# Patient Record
Sex: FEMALE | Race: WHITE | Employment: UNEMPLOYED | URBAN - METROPOLITAN AREA
[De-identification: names, ages, dates, MRNs, and addresses within clinical notes are randomized per-mention and may not be internally consistent; named-entity substitution may affect disease eponyms.]

---

## 2019-05-27 ENCOUNTER — HOSPITAL ENCOUNTER (EMERGENCY)
Age: 29
Discharge: HOME | End: 2019-05-27
Attending: EMERGENCY MEDICINE
Payer: COMMERCIAL

## 2019-05-27 ENCOUNTER — APPOINTMENT (OUTPATIENT)
Dept: CT IMAGING | Age: 29
End: 2019-05-27
Attending: EMERGENCY MEDICINE
Payer: COMMERCIAL

## 2019-05-27 VITALS
OXYGEN SATURATION: 98 % | WEIGHT: 202.6 LBS | TEMPERATURE: 97.5 F | DIASTOLIC BLOOD PRESSURE: 70 MMHG | SYSTOLIC BLOOD PRESSURE: 111 MMHG | HEART RATE: 53 BPM | RESPIRATION RATE: 26 BRPM

## 2019-05-27 DIAGNOSIS — N23 RENAL COLIC ON LEFT SIDE: Primary | ICD-10-CM

## 2019-05-27 LAB
ALBUMIN SERPL-MCNC: 3.9 G/DL (ref 3.5–5)
ALBUMIN/GLOB SERPL: 1.1 {RATIO} (ref 1.1–2.2)
ALP SERPL-CCNC: 77 U/L (ref 45–117)
ALT SERPL-CCNC: 23 U/L (ref 12–78)
AMPHET UR QL SCN: POSITIVE
ANION GAP SERPL CALC-SCNC: 15 MMOL/L (ref 5–15)
APPEARANCE UR: CLEAR
AST SERPL-CCNC: 14 U/L (ref 15–37)
BACTERIA URNS QL MICRO: ABNORMAL /HPF
BARBITURATES UR QL SCN: NEGATIVE
BASOPHILS # BLD: 0 K/UL (ref 0–0.1)
BASOPHILS NFR BLD: 0 % (ref 0–1)
BENZODIAZ UR QL: NEGATIVE
BILIRUB SERPL-MCNC: 0.4 MG/DL (ref 0.2–1)
BILIRUB UR QL CFM: NEGATIVE
BUN SERPL-MCNC: 9 MG/DL (ref 6–20)
BUN/CREAT SERPL: 9 (ref 12–20)
CALCIUM SERPL-MCNC: 8.8 MG/DL (ref 8.5–10.1)
CANNABINOIDS UR QL SCN: POSITIVE
CHLORIDE SERPL-SCNC: 101 MMOL/L (ref 97–108)
CO2 SERPL-SCNC: 26 MMOL/L (ref 21–32)
COCAINE UR QL SCN: NEGATIVE
COLOR UR: ABNORMAL
CREAT SERPL-MCNC: 0.98 MG/DL (ref 0.55–1.02)
DIFFERENTIAL METHOD BLD: ABNORMAL
DRUG SCRN COMMENT,DRGCM: ABNORMAL
EOSINOPHIL # BLD: 0 K/UL (ref 0–0.4)
EOSINOPHIL NFR BLD: 0 % (ref 0–7)
EPITH CASTS URNS QL MICRO: ABNORMAL /LPF
ERYTHROCYTE [DISTWIDTH] IN BLOOD BY AUTOMATED COUNT: 13.6 % (ref 11.5–14.5)
GLOBULIN SER CALC-MCNC: 3.5 G/DL (ref 2–4)
GLUCOSE SERPL-MCNC: 138 MG/DL (ref 65–100)
GLUCOSE UR STRIP.AUTO-MCNC: NEGATIVE MG/DL
HCG UR QL: NEGATIVE
HCT VFR BLD AUTO: 39.9 % (ref 35–47)
HGB BLD-MCNC: 13.1 G/DL (ref 11.5–16)
HGB UR QL STRIP: ABNORMAL
IMM GRANULOCYTES # BLD AUTO: 0 K/UL (ref 0–0.04)
IMM GRANULOCYTES NFR BLD AUTO: 0 % (ref 0–0.5)
KETONES UR QL STRIP.AUTO: ABNORMAL MG/DL
LEUKOCYTE ESTERASE UR QL STRIP.AUTO: NEGATIVE
LIPASE SERPL-CCNC: 89 U/L (ref 73–393)
LYMPHOCYTES # BLD: 2.4 K/UL (ref 0.8–3.5)
LYMPHOCYTES NFR BLD: 21 % (ref 12–49)
MAGNESIUM SERPL-MCNC: 1.8 MG/DL (ref 1.6–2.4)
MCH RBC QN AUTO: 29.4 PG (ref 26–34)
MCHC RBC AUTO-ENTMCNC: 32.8 G/DL (ref 30–36.5)
MCV RBC AUTO: 89.7 FL (ref 80–99)
METHADONE UR QL: NEGATIVE
MONOCYTES # BLD: 0.5 K/UL (ref 0–1)
MONOCYTES NFR BLD: 5 % (ref 5–13)
MUCOUS THREADS URNS QL MICRO: ABNORMAL /LPF
NEUTS SEG # BLD: 8.3 K/UL (ref 1.8–8)
NEUTS SEG NFR BLD: 74 % (ref 32–75)
NITRITE UR QL STRIP.AUTO: NEGATIVE
NRBC # BLD: 0 K/UL (ref 0–0.01)
NRBC BLD-RTO: 0 PER 100 WBC
OPIATES UR QL: NEGATIVE
PCP UR QL: NEGATIVE
PH UR STRIP: 6 [PH] (ref 5–8)
PLATELET # BLD AUTO: 294 K/UL (ref 150–400)
PMV BLD AUTO: 10.9 FL (ref 8.9–12.9)
POTASSIUM SERPL-SCNC: 3.2 MMOL/L (ref 3.5–5.1)
PROT SERPL-MCNC: 7.4 G/DL (ref 6.4–8.2)
PROT UR STRIP-MCNC: 100 MG/DL
RBC # BLD AUTO: 4.45 M/UL (ref 3.8–5.2)
RBC #/AREA URNS HPF: >100 /HPF (ref 0–5)
SODIUM SERPL-SCNC: 142 MMOL/L (ref 136–145)
SP GR UR REFRACTOMETRY: 1.03 (ref 1–1.03)
UR CULT HOLD, URHOLD: NORMAL
UROBILINOGEN UR QL STRIP.AUTO: 1 EU/DL (ref 0.2–1)
WBC # BLD AUTO: 11.3 K/UL (ref 3.6–11)
WBC URNS QL MICRO: ABNORMAL /HPF (ref 0–4)

## 2019-05-27 PROCEDURE — 81001 URINALYSIS AUTO W/SCOPE: CPT

## 2019-05-27 PROCEDURE — 80053 COMPREHEN METABOLIC PANEL: CPT

## 2019-05-27 PROCEDURE — 74176 CT ABD & PELVIS W/O CONTRAST: CPT

## 2019-05-27 PROCEDURE — 99285 EMERGENCY DEPT VISIT HI MDM: CPT

## 2019-05-27 PROCEDURE — 36415 COLL VENOUS BLD VENIPUNCTURE: CPT

## 2019-05-27 PROCEDURE — 93005 ELECTROCARDIOGRAM TRACING: CPT

## 2019-05-27 PROCEDURE — 96375 TX/PRO/DX INJ NEW DRUG ADDON: CPT

## 2019-05-27 PROCEDURE — 80307 DRUG TEST PRSMV CHEM ANLYZR: CPT

## 2019-05-27 PROCEDURE — 96374 THER/PROPH/DIAG INJ IV PUSH: CPT

## 2019-05-27 PROCEDURE — 83735 ASSAY OF MAGNESIUM: CPT

## 2019-05-27 PROCEDURE — 96361 HYDRATE IV INFUSION ADD-ON: CPT

## 2019-05-27 PROCEDURE — 83690 ASSAY OF LIPASE: CPT

## 2019-05-27 PROCEDURE — 81025 URINE PREGNANCY TEST: CPT

## 2019-05-27 PROCEDURE — 74011250636 HC RX REV CODE- 250/636: Performed by: EMERGENCY MEDICINE

## 2019-05-27 PROCEDURE — 85025 COMPLETE CBC W/AUTO DIFF WBC: CPT

## 2019-05-27 RX ORDER — OXYCODONE AND ACETAMINOPHEN 5; 325 MG/1; MG/1
1-2 TABLET ORAL
Qty: 20 TAB | Refills: 0 | Status: SHIPPED | OUTPATIENT
Start: 2019-05-27 | End: 2019-05-29 | Stop reason: SINTOL

## 2019-05-27 RX ORDER — ONDANSETRON 8 MG/1
8 TABLET, ORALLY DISINTEGRATING ORAL
Qty: 12 TAB | Refills: 0 | Status: SHIPPED | OUTPATIENT
Start: 2019-05-27 | End: 2019-05-29

## 2019-05-27 RX ORDER — KETOROLAC TROMETHAMINE 30 MG/ML
15 INJECTION, SOLUTION INTRAMUSCULAR; INTRAVENOUS
Status: COMPLETED | OUTPATIENT
Start: 2019-05-27 | End: 2019-05-27

## 2019-05-27 RX ORDER — TAMSULOSIN HYDROCHLORIDE 0.4 MG/1
0.4 CAPSULE ORAL DAILY
Qty: 15 CAP | Refills: 0 | Status: SHIPPED | OUTPATIENT
Start: 2019-05-27 | End: 2019-06-11

## 2019-05-27 RX ORDER — ONDANSETRON 2 MG/ML
4 INJECTION INTRAMUSCULAR; INTRAVENOUS
Status: COMPLETED | OUTPATIENT
Start: 2019-05-27 | End: 2019-05-27

## 2019-05-27 RX ADMIN — KETOROLAC TROMETHAMINE 15 MG: 30 INJECTION, SOLUTION INTRAMUSCULAR at 18:12

## 2019-05-27 RX ADMIN — ONDANSETRON 4 MG: 2 INJECTION INTRAMUSCULAR; INTRAVENOUS at 18:12

## 2019-05-27 RX ADMIN — SODIUM CHLORIDE 1000 ML: 900 INJECTION, SOLUTION INTRAVENOUS at 18:12

## 2019-05-27 NOTE — ED TRIAGE NOTES
Triage Note: Pt arrives via EMS from home for abdominal pain and back pain. Per patient she was outside all day and had a syncopal episode after coming inside the house. Reports poor PO intake today. Pain is in her left pelvic area and lower back.

## 2019-05-27 NOTE — DISCHARGE INSTRUCTIONS
- Flomax as prescribed. - Percocet as needed for pain. Zofran as needed for nausea and/ or vomiting.  - Please follow-up with [facility]. You may call their Kidney Stone Hotline at [phone] to arrange follow-up. - Return to ED for fever, uncontrolled pain, persistent vomiting, any other concerns. Kidney Stone: Care Instructions  Your Care Instructions    Kidney stones are formed when salts, minerals, and other substances normally found in the urine clump together. They can be as small as grains of sand or, rarely, as large as golf balls. While the stone is traveling through the ureter, which is the tube that carries urine from the kidney to the bladder, you will probably feel pain. The pain may be mild or very severe. You may also have some blood in your urine. As soon as the stone reaches the bladder, any intense pain should go away. If a stone is too large to pass on its own, you may need a medical procedure to help you pass the stone. The doctor has checked you carefully, but problems can develop later. If you notice any problems or new symptoms, get medical treatment right away. Follow-up care is a key part of your treatment and safety. Be sure to make and go to all appointments, and call your doctor if you are having problems. It's also a good idea to know your test results and keep a list of the medicines you take. How can you care for yourself at home? · Drink plenty of fluids, enough so that your urine is light yellow or clear like water. If you have kidney, heart, or liver disease and have to limit fluids, talk with your doctor before you increase the amount of fluids you drink. · Take pain medicines exactly as directed. Call your doctor if you think you are having a problem with your medicine. ? If the doctor gave you a prescription medicine for pain, take it as prescribed.   ? If you are not taking a prescription pain medicine, ask your doctor if you can take an over-the-counter medicine. Read and follow all instructions on the label. · Your doctor may ask you to strain your urine so that you can collect your kidney stone when it passes. You can use a kitchen strainer or a tea strainer to catch the stone. Store it in a plastic bag until you see your doctor again. Preventing future kidney stones  Some changes in your diet may help prevent kidney stones. Depending on the cause of your stones, your doctor may recommend that you:  · Drink plenty of fluids, enough so that your urine is light yellow or clear like water. If you have kidney, heart, or liver disease and have to limit fluids, talk with your doctor before you increase the amount of fluids you drink. · Limit coffee, tea, and alcohol. Also avoid grapefruit juice. · Do not take more than the recommended daily dose of vitamins C and D.  · Avoid antacids such as Gaviscon, Maalox, Mylanta, or Tums. · Limit the amount of salt (sodium) in your diet. · Eat a balanced diet that is not too high in protein. · Limit foods that are high in a substance called oxalate, which can cause kidney stones. These foods include dark green vegetables, rhubarb, chocolate, wheat bran, nuts, cranberries, and beans. When should you call for help? Call your doctor now or seek immediate medical care if:    · You cannot keep down fluids.     · Your pain gets worse.     · You have a fever or chills.     · You have new or worse pain in your back just below your rib cage (the flank area).     · You have new or more blood in your urine.    Watch closely for changes in your health, and be sure to contact your doctor if:    · You do not get better as expected. Where can you learn more? Go to http://marcio-vijay.info/. Enter S255 in the search box to learn more about \"Kidney Stone: Care Instructions. \"  Current as of: March 14, 2018  Content Version: 11.9  © 9513-9206 [employer].  Care instructions adapted under license by [employer] (which disclaims liability or warranty for this information). If you have questions about a medical condition or this instruction, always ask your healthcare professional. [address] any warranty or liability for your use of this information. Learning About Diet for Kidney Stone Prevention  What are kidney stones? Kidney stones are made of salts and minerals in the urine that form small \"esthela. \" Stones can form in the kidneys and the ureters (the tubes that lead from the kidneys to the bladder). They can also form in the bladder. Stones may not cause a problem as long as they stay in the kidneys. But they can cause sudden, severe pain. Pain is most likely when the stones travel from the kidneys to the bladder. Kidney stones can cause bloody urine. Kidney stones often run in families. You are more likely to get them if you don't drink enough fluids, mainly water. Certain foods and drinks and some dietary supplements may also increase your risk for kidney stones if you consume too much of them. What can you do to prevent kidney stones? Changing what you eat may not prevent all types of kidney stones. But for people who have a history of certain kinds of kidney stones, some changes in diet may help. A dietitian can help you set up a meal plan that includes healthy, low-oxalate choices. Here are some general guidelines to get you started. Plan your meals and snacks around foods that are low in oxalate. These foods include:  · Corn, kale, parsnips, and squash,. · Beef, chicken, pork, turkey, and fish. · Milk, butter, cheese, and yogurt. You can eat certain foods that are medium-high in oxalate, but eat them only once in a while. These foods include:  · Bread. · Brown rice. · English muffins. · Figs. · Popcorn. · String beans. · Tomatoes. Limit very high-oxalate foods, including:  · Black tea. · Coffee. · Chocolate. · Dark green vegetables. · Nuts.   Here are some other things you can do to help prevent kidney stones. · Drink plenty of fluids. If you have kidney, heart, or liver disease and have to limit fluids, talk with your doctor before you increase the amount of fluids you drink. · Do not take more than the recommended daily dose of vitamins C and D.  · Limit the salt in your diet. · Eat a balanced diet that is not too high in protein. Follow-up care is a key part of your treatment and safety. Be sure to make and go to all appointments, and call your doctor if you are having problems. It's also a good idea to know your test results and keep a list of the medicines you take. Where can you learn more? Go to http://marcio-vijay.info/. Enter C138 in the search box to learn more about \"Learning About Diet for Kidney Stone Prevention. \"  Current as of: March 28, 2018  Content Version: 11.9  © 9234-3677 [employer]. Care instructions adapted under license by [employer] (which disclaims liability or warranty for this information). If you have questions about a medical condition or this instruction, always ask your healthcare professional. [place] [address] any warranty or liability for your use of this information.

## 2019-05-27 NOTE — ED NOTES
7:00 PM  Change of shift. Care of patient taken over from Dr. Jeremy Urbina; H&P reviewed, bedside handoff complete. Awaiting Ct results. 7:54 PM  2 mm L distal stone. Pain controlled. F/U with [facility].

## 2019-05-27 NOTE — ED PROVIDER NOTES
The history is provided by the patient and a relative. No  was used. Abdominal Pain    This is a new problem. The current episode started 1 to 2 hours ago. The problem occurs constantly. The problem has not changed since onset. The pain is associated with vomiting. The pain is located in the LLQ. The quality of the pain is dull and aching. The pain is at a severity of 9/10. The pain is severe. Associated symptoms include diarrhea, nausea, vomiting and back pain. Pertinent negatives include no anorexia, no fever, no belching, no flatus, no hematochezia, no melena, no constipation, no dysuria, no frequency, no hematuria, no headaches, no arthralgias, no myalgias, no trauma and no chest pain. Nothing worsens the pain. The pain is relieved by nothing. Past Medical History:   Diagnosis Date    Anxiety     Asthma     Depression        Past Surgical History:   Procedure Laterality Date    HX HERNIA REPAIR           History reviewed. No pertinent family history.     Social History     Socioeconomic History    Marital status: SINGLE     Spouse name: Not on file    Number of children: Not on file    Years of education: Not on file    Highest education level: Not on file   Occupational History    Not on file   Social Needs    Financial resource strain: Not on file    Food insecurity:     Worry: Not on file     Inability: Not on file    Transportation needs:     Medical: Not on file     Non-medical: Not on file   Tobacco Use    Smoking status: Current Every Day Smoker    Smokeless tobacco: Never Used   Substance and Sexual Activity    Alcohol use: Not Currently    Drug use: Not on file    Sexual activity: Not on file   Lifestyle    Physical activity:     Days per week: Not on file     Minutes per session: Not on file    Stress: Not on file   Relationships    Social connections:     Talks on phone: Not on file     Gets together: Not on file     Attends Adventism service: Not on file Active member of club or organization: Not on file     Attends meetings of clubs or organizations: Not on file     Relationship status: Not on file    Intimate partner violence:     Fear of current or ex partner: Not on file     Emotionally abused: Not on file     Physically abused: Not on file     Forced sexual activity: Not on file   Other Topics Concern    Not on file   Social History Narrative    Not on file         ALLERGIES: Patient has no known allergies. Review of Systems   Constitutional: Negative for activity change, chills and fever. HENT: Negative for nosebleeds, sore throat, trouble swallowing and voice change. Eyes: Negative for visual disturbance. Respiratory: Negative for shortness of breath. Cardiovascular: Negative for chest pain and palpitations. Gastrointestinal: Positive for abdominal pain, diarrhea, nausea and vomiting. Negative for anorexia, constipation, flatus, hematochezia and melena. Genitourinary: Positive for flank pain and pelvic pain. Negative for difficulty urinating, dysuria, frequency, hematuria and urgency. Musculoskeletal: Positive for back pain. Negative for arthralgias, myalgias, neck pain and neck stiffness. Skin: Negative for color change. Allergic/Immunologic: Negative for immunocompromised state. Neurological: Negative for dizziness, seizures, syncope, weakness, light-headedness, numbness and headaches. Psychiatric/Behavioral: Negative for behavioral problems, confusion, hallucinations, self-injury and suicidal ideas. There were no vitals filed for this visit. Physical Exam   Constitutional: She is oriented to person, place, and time. She appears well-developed and well-nourished. No distress. HENT:   Head: Normocephalic and atraumatic. Eyes: Pupils are equal, round, and reactive to light. Neck: Normal range of motion. Neck supple. Cardiovascular: Regular rhythm and normal heart sounds. Bradycardia present.  Exam reveals no gallop and no friction rub. No murmur heard. Pulmonary/Chest: Effort normal and breath sounds normal. No respiratory distress. She has no wheezes. Abdominal: Soft. Bowel sounds are normal. She exhibits no distension. There is generalized tenderness and tenderness in the left upper quadrant and left lower quadrant. There is no rebound and no guarding. Musculoskeletal: Normal range of motion. Neurological: She is alert and oriented to person, place, and time. Skin: Skin is warm. No rash noted. She is not diaphoretic. Psychiatric: She has a normal mood and affect. Her behavior is normal. Judgment and thought content normal.   Nursing note and vitals reviewed. MDM     This is a 27-year-old female with past medical history, review of systems, physical exam as above, presenting with complaints of sudden onset left groin, left flank, left back pain, approximately 1-2 hours prior to arrival. She endorses nausea and vomiting, secondary to pain, states she's been having intermittent loose bowel movements, without abdominal pain. She endorses a history of previous , denies dysuria, hematuria, states it's recycles have not restarted since she has a 11month-old child. Physical exam is remarkable for uncomfortable young well-appearing female, in no acute distress, with mild left adnexal, left flank, left CVA tenderness to palpation, without rebound or guarding, generalized abdominal tenderness otherwise, clear breath sounds, bradycardic rate without murmurs gallops or rubs. Mother at bedside states there is a family history of renal colic. Plan to provide antiemetics, pain control, fluid bolus, obtain CMP, CBC, UA, DPT, UDS, lipase, noncontrast CT. We will reevaluate, consider further imaging, and make a disposition. Procedures    BESIDE SIGN OUT:  7:06 PM  Discussed pt's hx, disposition, and available diagnostic and imaging results with Dr. Srinath Osorio at bedside with the patient.  Reviewed care plans. Both providers and patient are in agreement with care plan. Dr. Michael Wray is transferring care of the pt to Dr. Alannah Varghese at this time.

## 2019-05-28 LAB
ATRIAL RATE: 48 BPM
CALCULATED P AXIS, ECG09: 56 DEGREES
CALCULATED R AXIS, ECG10: 65 DEGREES
CALCULATED T AXIS, ECG11: 52 DEGREES
DIAGNOSIS, 93000: NORMAL
P-R INTERVAL, ECG05: 98 MS
Q-T INTERVAL, ECG07: 496 MS
QRS DURATION, ECG06: 98 MS
QTC CALCULATION (BEZET), ECG08: 443 MS
VENTRICULAR RATE, ECG03: 48 BPM

## 2019-05-28 NOTE — ED NOTES
Pt. Called wanting something other than percocet because she is at work. Spoke to Dr. Toby Santos for an alternative and he suggested ibuprofen for patient. Pt. Stated when she is on the floor in pain ibuprofen doesn't work. I explained to her that ibuprofen and ketorolac are both anti-inflammatory. Suggested she call stone hot line or she could be seen here if she felt like she should. Pt. Said: \"Nevermind, thank you and have a nice day\". And hung up.

## 2019-05-28 NOTE — ED NOTES
Discharge instructions reviewed with pt and copy given along with RX by this RN. Pt educated on follow up with [facility] as well as pain and nausea management at home and changes to make in diet to decrease chances of producing another kidney stone. Pt verbalized understanding of all education. Pt ambulatory from ED accompanied by mother and family member in no sign of distress or discomfort.

## 2019-05-29 ENCOUNTER — HOSPITAL ENCOUNTER (EMERGENCY)
Age: 29
Discharge: HOME | End: 2019-05-29
Attending: EMERGENCY MEDICINE
Payer: MEDICAID

## 2019-05-29 VITALS
TEMPERATURE: 98 F | BODY MASS INDEX: 30.63 KG/M2 | DIASTOLIC BLOOD PRESSURE: 87 MMHG | WEIGHT: 206.79 LBS | HEART RATE: 80 BPM | SYSTOLIC BLOOD PRESSURE: 120 MMHG | OXYGEN SATURATION: 98 % | HEIGHT: 69 IN | RESPIRATION RATE: 20 BRPM

## 2019-05-29 DIAGNOSIS — N20.0 KIDNEY STONE: Primary | ICD-10-CM

## 2019-05-29 LAB
AMORPH CRY URNS QL MICRO: ABNORMAL
ANION GAP SERPL CALC-SCNC: 9 MMOL/L (ref 5–15)
APPEARANCE UR: ABNORMAL
BACTERIA URNS QL MICRO: NEGATIVE /HPF
BASOPHILS # BLD: 0 K/UL (ref 0–0.1)
BASOPHILS NFR BLD: 0 % (ref 0–1)
BILIRUB UR QL CFM: NEGATIVE
BUN SERPL-MCNC: 10 MG/DL (ref 6–20)
BUN/CREAT SERPL: 7 (ref 12–20)
CALCIUM SERPL-MCNC: 8.8 MG/DL (ref 8.5–10.1)
CHLORIDE SERPL-SCNC: 103 MMOL/L (ref 97–108)
CO2 SERPL-SCNC: 27 MMOL/L (ref 21–32)
COLOR UR: YELLOW
CREAT SERPL-MCNC: 1.39 MG/DL (ref 0.55–1.02)
DIFFERENTIAL METHOD BLD: ABNORMAL
EOSINOPHIL # BLD: 0 K/UL (ref 0–0.4)
EOSINOPHIL NFR BLD: 0 % (ref 0–7)
EPITH CASTS URNS QL MICRO: ABNORMAL /LPF
ERYTHROCYTE [DISTWIDTH] IN BLOOD BY AUTOMATED COUNT: 13.7 % (ref 11.5–14.5)
GLUCOSE SERPL-MCNC: 97 MG/DL (ref 65–100)
GLUCOSE UR STRIP.AUTO-MCNC: NEGATIVE MG/DL
HCT VFR BLD AUTO: 37.5 % (ref 35–47)
HGB BLD-MCNC: 12.5 G/DL (ref 11.5–16)
HGB UR QL STRIP: ABNORMAL
IMM GRANULOCYTES # BLD AUTO: 0 K/UL (ref 0–0.04)
IMM GRANULOCYTES NFR BLD AUTO: 0 % (ref 0–0.5)
KETONES UR QL STRIP.AUTO: ABNORMAL MG/DL
LEUKOCYTE ESTERASE UR QL STRIP.AUTO: NEGATIVE
LYMPHOCYTES # BLD: 1.5 K/UL (ref 0.8–3.5)
LYMPHOCYTES NFR BLD: 16 % (ref 12–49)
MCH RBC QN AUTO: 29.6 PG (ref 26–34)
MCHC RBC AUTO-ENTMCNC: 33.3 G/DL (ref 30–36.5)
MCV RBC AUTO: 88.7 FL (ref 80–99)
MONOCYTES # BLD: 0.8 K/UL (ref 0–1)
MONOCYTES NFR BLD: 8 % (ref 5–13)
MUCOUS THREADS URNS QL MICRO: ABNORMAL /LPF
NEUTS SEG # BLD: 7.3 K/UL (ref 1.8–8)
NEUTS SEG NFR BLD: 76 % (ref 32–75)
NITRITE UR QL STRIP.AUTO: NEGATIVE
NRBC # BLD: 0 K/UL (ref 0–0.01)
NRBC BLD-RTO: 0 PER 100 WBC
PH UR STRIP: 6 [PH] (ref 5–8)
PLATELET # BLD AUTO: 215 K/UL (ref 150–400)
PMV BLD AUTO: 11.2 FL (ref 8.9–12.9)
POTASSIUM SERPL-SCNC: 3.6 MMOL/L (ref 3.5–5.1)
PROT UR STRIP-MCNC: ABNORMAL MG/DL
RBC # BLD AUTO: 4.23 M/UL (ref 3.8–5.2)
RBC #/AREA URNS HPF: ABNORMAL /HPF (ref 0–5)
SODIUM SERPL-SCNC: 139 MMOL/L (ref 136–145)
SP GR UR REFRACTOMETRY: 1.02 (ref 1–1.03)
UR CULT HOLD, URHOLD: NORMAL
UROBILINOGEN UR QL STRIP.AUTO: 0.2 EU/DL (ref 0.2–1)
WBC # BLD AUTO: 9.7 K/UL (ref 3.6–11)
WBC URNS QL MICRO: ABNORMAL /HPF (ref 0–4)

## 2019-05-29 PROCEDURE — 81001 URINALYSIS AUTO W/SCOPE: CPT

## 2019-05-29 PROCEDURE — 96361 HYDRATE IV INFUSION ADD-ON: CPT

## 2019-05-29 PROCEDURE — 96374 THER/PROPH/DIAG INJ IV PUSH: CPT

## 2019-05-29 PROCEDURE — 74011250636 HC RX REV CODE- 250/636: Performed by: EMERGENCY MEDICINE

## 2019-05-29 PROCEDURE — 85025 COMPLETE CBC W/AUTO DIFF WBC: CPT

## 2019-05-29 PROCEDURE — 80048 BASIC METABOLIC PNL TOTAL CA: CPT

## 2019-05-29 PROCEDURE — 96375 TX/PRO/DX INJ NEW DRUG ADDON: CPT

## 2019-05-29 PROCEDURE — 36415 COLL VENOUS BLD VENIPUNCTURE: CPT

## 2019-05-29 PROCEDURE — 99284 EMERGENCY DEPT VISIT MOD MDM: CPT

## 2019-05-29 RX ORDER — ACETAMINOPHEN 500 MG
1000 TABLET ORAL 3 TIMES DAILY
Qty: 24 TAB | Refills: 0 | Status: SHIPPED | OUTPATIENT
Start: 2019-05-29 | End: 2019-06-02

## 2019-05-29 RX ORDER — KETOROLAC TROMETHAMINE 30 MG/ML
30 INJECTION, SOLUTION INTRAMUSCULAR; INTRAVENOUS
Status: COMPLETED | OUTPATIENT
Start: 2019-05-29 | End: 2019-05-29

## 2019-05-29 RX ORDER — ONDANSETRON 8 MG/1
8 TABLET, ORALLY DISINTEGRATING ORAL
Qty: 12 TAB | Refills: 0 | Status: SHIPPED | OUTPATIENT
Start: 2019-05-29 | End: 2019-06-05

## 2019-05-29 RX ORDER — FENTANYL CITRATE 50 UG/ML
100 INJECTION, SOLUTION INTRAMUSCULAR; INTRAVENOUS
Status: COMPLETED | OUTPATIENT
Start: 2019-05-29 | End: 2019-05-29

## 2019-05-29 RX ORDER — KETOROLAC TROMETHAMINE 10 MG/1
10 TABLET, FILM COATED ORAL
Qty: 20 TAB | Refills: 0 | Status: SHIPPED | OUTPATIENT
Start: 2019-05-29 | End: 2019-06-03

## 2019-05-29 RX ORDER — ONDANSETRON 2 MG/ML
4 INJECTION INTRAMUSCULAR; INTRAVENOUS
Status: COMPLETED | OUTPATIENT
Start: 2019-05-29 | End: 2019-05-29

## 2019-05-29 RX ADMIN — ONDANSETRON 4 MG: 2 INJECTION INTRAMUSCULAR; INTRAVENOUS at 21:48

## 2019-05-29 RX ADMIN — KETOROLAC TROMETHAMINE 30 MG: 30 INJECTION, SOLUTION INTRAMUSCULAR at 21:48

## 2019-05-29 RX ADMIN — FENTANYL CITRATE 100 MCG: 50 INJECTION, SOLUTION INTRAMUSCULAR; INTRAVENOUS at 21:48

## 2019-05-29 RX ADMIN — SODIUM CHLORIDE 1000 ML: 900 INJECTION, SOLUTION INTRAVENOUS at 21:55

## 2019-05-29 NOTE — LETTER
I have been throwing up tonight.   Started the day with dizziness first. [address] 
[facility] 
[address] [facility] [id number] 
[phone] Work/School Note Date: 5/29/2019 To Whom It May concern: 
 
Imer Shah was seen and treated today in the emergency room by the following provider(s): 
Attending Provider: Laure Gonsalves MD. Imer Shah may return to work on 5/31/2019.  
 
Sincerely, 
 
 
 
 
Tere Kaufman MD

## 2019-05-30 NOTE — ED TRIAGE NOTES
Pt ambulatory to ed with complaints of left flank pain and N/V. Pt was dx with kidney stones Monday.

## 2019-05-30 NOTE — ED PROVIDER NOTES
61-year-old female presenting to ER with complaints of left flank pain and nausea and vomiting. Patient was seen 2 days ago and diagnosed with kidney stone, 2 mm in the distal ureter  Given prescription for Zofran and Percocet. Patient has not been taking any anti-inflammatory. Patient reports having continued pain and episodes of nausea vomiting. Patient denies any fevers or chills. No dysuria. Stating that the pain is waxing and waning causing her to nauseous and not able to tolerate her medications. Patient reports not taking any anti-inflammatories stating it does not work. Negative pregnancy test 2 nights ago  Past med history of anxiety, asthma, depression      Kidney Stone    Associated symptoms include abdominal pain. Pertinent negatives include no chest pain, no fever, no headaches and no dysuria. Past Medical History:   Diagnosis Date    Anxiety     Asthma     Depression        Past Surgical History:   Procedure Laterality Date    HX HERNIA REPAIR           History reviewed. No pertinent family history.     Social History     Socioeconomic History    Marital status: SINGLE     Spouse name: Not on file    Number of children: Not on file    Years of education: Not on file    Highest education level: Not on file   Occupational History    Not on file   Social Needs    Financial resource strain: Not on file    Food insecurity:     Worry: Not on file     Inability: Not on file    Transportation needs:     Medical: Not on file     Non-medical: Not on file   Tobacco Use    Smoking status: Current Every Day Smoker     Packs/day: 1.00    Smokeless tobacco: Never Used   Substance and Sexual Activity    Alcohol use: Not Currently    Drug use: Never    Sexual activity: Not on file   Lifestyle    Physical activity:     Days per week: Not on file     Minutes per session: Not on file    Stress: Not on file   Relationships    Social connections:     Talks on phone: Not on file     Gets together: Not on file     Attends Buddhism service: Not on file     Active member of club or organization: Not on file     Attends meetings of clubs or organizations: Not on file     Relationship status: Not on file    Intimate partner violence:     Fear of current or ex partner: Not on file     Emotionally abused: Not on file     Physically abused: Not on file     Forced sexual activity: Not on file   Other Topics Concern    Not on file   Social History Narrative    Not on file         ALLERGIES: Patient has no known allergies. Review of Systems   Constitutional: Negative for chills and fever. HENT: Negative for congestion and sore throat. Eyes: Negative for pain. Respiratory: Negative for shortness of breath. Cardiovascular: Negative for chest pain. Gastrointestinal: Positive for abdominal pain, nausea and vomiting. Negative for diarrhea. Genitourinary: Positive for flank pain. Negative for dysuria. Musculoskeletal: Negative for back pain and neck pain. Skin: Negative for rash. Neurological: Negative for dizziness and headaches. Vitals:    05/29/19 2200 05/29/19 2215 05/29/19 2221 05/29/19 2231   BP: 122/78 120/87     Pulse:    80   Resp:    20   Temp:    98 °F (36.7 °C)   SpO2: 100% 97% 98% 98%   Weight:       Height:                Physical Exam   Constitutional: She is oriented to person, place, and time. She appears well-developed and well-nourished. Cry, anxious and mild distress   HENT:   Head: Normocephalic. Mouth/Throat: Oropharynx is clear and moist.   Eyes: Conjunctivae are normal.   Neck: Normal range of motion. Neck supple. Cardiovascular: Normal rate and regular rhythm. Pulmonary/Chest: Effort normal and breath sounds normal. No respiratory distress. Abdominal: Soft. Bowel sounds are normal. There is no tenderness. There is no rigidity, no rebound, no guarding, no CVA tenderness, no tenderness at McBurney's point and negative Kilpatrick's sign.    Flank pain, non reproducible   Musculoskeletal: Normal range of motion. Neurological: She is alert and oriented to person, place, and time. No gross motor or sensory deficits   Skin: Skin is warm. Capillary refill takes less than 2 seconds. No rash noted. Recent Results (from the past 24 hour(s))   METABOLIC PANEL, BASIC    Collection Time: 05/29/19  9:41 PM   Result Value Ref Range    Sodium 139 136 - 145 mmol/L    Potassium 3.6 3.5 - 5.1 mmol/L    Chloride 103 97 - 108 mmol/L    CO2 27 21 - 32 mmol/L    Anion gap 9 5 - 15 mmol/L    Glucose 97 65 - 100 mg/dL    BUN 10 6 - 20 MG/DL    Creatinine 1.39 (H) 0.55 - 1.02 MG/DL    BUN/Creatinine ratio 7 (L) 12 - 20      GFR est AA 55 (L) >60 ml/min/1.73m2    GFR est non-AA 45 (L) >60 ml/min/1.73m2    Calcium 8.8 8.5 - 10.1 MG/DL   CBC WITH AUTOMATED DIFF    Collection Time: 05/29/19  9:41 PM   Result Value Ref Range    WBC 9.7 3.6 - 11.0 K/uL    RBC 4.23 3.80 - 5.20 M/uL    HGB 12.5 11.5 - 16.0 g/dL    HCT 37.5 35.0 - 47.0 %    MCV 88.7 80.0 - 99.0 FL    MCH 29.6 26.0 - 34.0 PG    MCHC 33.3 30.0 - 36.5 g/dL    RDW 13.7 11.5 - 14.5 %    PLATELET 372 197 - 766 K/uL    MPV 11.2 8.9 - 12.9 FL    NRBC 0.0 0  WBC    ABSOLUTE NRBC 0.00 0.00 - 0.01 K/uL    NEUTROPHILS 76 (H) 32 - 75 %    LYMPHOCYTES 16 12 - 49 %    MONOCYTES 8 5 - 13 %    EOSINOPHILS 0 0 - 7 %    BASOPHILS 0 0 - 1 %    IMMATURE GRANULOCYTES 0 0.0 - 0.5 %    ABS. NEUTROPHILS 7.3 1.8 - 8.0 K/UL    ABS. LYMPHOCYTES 1.5 0.8 - 3.5 K/UL    ABS. MONOCYTES 0.8 0.0 - 1.0 K/UL    ABS. EOSINOPHILS 0.0 0.0 - 0.4 K/UL    ABS. BASOPHILS 0.0 0.0 - 0.1 K/UL    ABS. IMM.  GRANS. 0.0 0.00 - 0.04 K/UL    DF AUTOMATED     URINALYSIS W/ RFLX MICROSCOPIC    Collection Time: 05/29/19 10:20 PM   Result Value Ref Range    Color YELLOW      Appearance HAZY (A) CLEAR      Specific gravity 1.025 1.003 - 1.030      pH (UA) 6.0 5.0 - 8.0      Protein TRACE (A) NEG mg/dL    Glucose NEGATIVE  NEG mg/dL    Ketone TRACE (A) NEG mg/dL    Blood LARGE (A) NEG      Urobilinogen 0.2 0.2 - 1.0 EU/dL    Nitrites NEGATIVE  NEG      Leukocyte Esterase NEGATIVE  NEG     URINE CULTURE HOLD SAMPLE    Collection Time: 05/29/19 10:20 PM   Result Value Ref Range    Urine culture hold        URINE ON HOLD IN MICROBIOLOGY DEPT FOR 3 DAYS. IF UNPRESERVED URINE IS SUBMITTED, IT CANNOT BE USED FOR ADDITIONAL TESTING AFTER 24 HRS, RECOLLECTION WILL BE REQUIRED. No results found. MDM  Number of Diagnoses or Management Options  Kidney stone:   Diagnosis management comments: Patient was recent diagnosis of left kidney stone presenting with pain but mainly of nausea and vomiting. Patient reports worsening nausea with taking the Percocet. Patient reports that she stopped taking the Percocet as developed nausea with vomiting and Tylenol for pain. Patient's pain improved significantly after receiving Toradol in the ER.  No fevers no UTI  Patient be discharged with Toradol  Given stone hotline information       Amount and/or Complexity of Data Reviewed  Clinical lab tests: reviewed      ED Course as of May 29 2239   Wed May 29, 2019   2227 Pt's pain is improved    [ZD]      ED Course User Index  [ZD] Luis Alberto Chi MD       Procedures

## 2019-05-30 NOTE — DISCHARGE INSTRUCTIONS
Patient Education        Kidney Stone: Care Instructions  Your Care Instructions    Kidney stones are formed when salts, minerals, and other substances normally found in the urine clump together. They can be as small as grains of sand or, rarely, as large as golf balls. While the stone is traveling through the ureter, which is the tube that carries urine from the kidney to the bladder, you will probably feel pain. The pain may be mild or very severe. You may also have some blood in your urine. As soon as the stone reaches the bladder, any intense pain should go away. If a stone is too large to pass on its own, you may need a medical procedure to help you pass the stone. The doctor has checked you carefully, but problems can develop later. If you notice any problems or new symptoms, get medical treatment right away. Follow-up care is a key part of your treatment and safety. Be sure to make and go to all appointments, and call your doctor if you are having problems. It's also a good idea to know your test results and keep a list of the medicines you take. How can you care for yourself at home? · Drink plenty of fluids, enough so that your urine is light yellow or clear like water. If you have kidney, heart, or liver disease and have to limit fluids, talk with your doctor before you increase the amount of fluids you drink. · Take pain medicines exactly as directed. Call your doctor if you think you are having a problem with your medicine. ? If the doctor gave you a prescription medicine for pain, take it as prescribed. ? If you are not taking a prescription pain medicine, ask your doctor if you can take an over-the-counter medicine. Read and follow all instructions on the label. · Your doctor may ask you to strain your urine so that you can collect your kidney stone when it passes. You can use a kitchen strainer or a tea strainer to catch the stone.  Store it in a plastic bag until you see your doctor again.  Preventing future kidney stones  Some changes in your diet may help prevent kidney stones. Depending on the cause of your stones, your doctor may recommend that you:  · Drink plenty of fluids, enough so that your urine is light yellow or clear like water. If you have kidney, heart, or liver disease and have to limit fluids, talk with your doctor before you increase the amount of fluids you drink. · Limit coffee, tea, and alcohol. Also avoid grapefruit juice. · Do not take more than the recommended daily dose of vitamins C and D.  · Avoid antacids such as Gaviscon, Maalox, Mylanta, or Tums. · Limit the amount of salt (sodium) in your diet. · Eat a balanced diet that is not too high in protein. · Limit foods that are high in a substance called oxalate, which can cause kidney stones. These foods include dark green vegetables, rhubarb, chocolate, wheat bran, nuts, cranberries, and beans. When should you call for help? Call your doctor now or seek immediate medical care if:    · You cannot keep down fluids.     · Your pain gets worse.     · You have a fever or chills.     · You have new or worse pain in your back just below your rib cage (the flank area).     · You have new or more blood in your urine.    Watch closely for changes in your health, and be sure to contact your doctor if:    · You do not get better as expected. Where can you learn more? Go to http://marcio-vijay.info/. Enter N015 in the search box to learn more about \"Kidney Stone: Care Instructions. \"  Current as of: March 14, 2018  Content Version: 11.9  © 8892-1601 [employer]. Care instructions adapted under license by [employer] (which disclaims liability or warranty for this information).  If you have questions about a medical condition or this instruction, always ask your healthcare professional. [address] any warranty or liability for your use of this information.

## 2019-05-30 NOTE — ED NOTES
-- On warfarin for h/o PE  -- will get U/S BL UE and LE to eval for need for IVF   Pt provided with warm blanket at this time. Pt denies ability to urinate at this time.

## 2020-05-03 ENCOUNTER — HOSPITAL ENCOUNTER (EMERGENCY)
Age: 30
Discharge: HOME | End: 2020-05-03
Attending: EMERGENCY MEDICINE | Admitting: EMERGENCY MEDICINE
Payer: COMMERCIAL

## 2020-05-03 VITALS — OXYGEN SATURATION: 98 % | RESPIRATION RATE: 16 BRPM | TEMPERATURE: 98.4 F | HEART RATE: 85 BPM

## 2020-05-03 DIAGNOSIS — B34.9 VIRAL INFECTION: Primary | ICD-10-CM

## 2020-05-03 PROCEDURE — 99282 EMERGENCY DEPT VISIT SF MDM: CPT

## 2020-05-03 PROCEDURE — 87635 SARS-COV-2 COVID-19 AMP PRB: CPT

## 2020-05-03 RX ORDER — BENZONATATE 100 MG/1
100 CAPSULE ORAL
Qty: 30 CAP | Refills: 0 | Status: SHIPPED | OUTPATIENT
Start: 2020-05-03 | End: 2020-05-10

## 2020-05-04 ENCOUNTER — TELEPHONE (OUTPATIENT)
Dept: CASE MANAGEMENT | Age: 30
End: 2020-05-04

## 2020-05-04 NOTE — DISCHARGE INSTRUCTIONS
Patient Education        Viral Infections: Care Instructions  Your Care Instructions    You don't feel well, but it's not clear what's causing it. You may have a viral infection. Viruses cause many illnesses, such as the common cold, influenza, fever, rashes, and the diarrhea, nausea, and vomiting that are often called \"stomach flu. \" You may wonder if antibiotic medicines could make you feel better. But antibiotics only treat infections caused by bacteria. They don't work on viruses. The good news is that viral infections usually aren't serious. Most will go away in a few days without medical treatment. In the meantime, there are a few things you can do to make yourself more comfortable. Follow-up care is a key part of your treatment and safety. Be sure to make and go to all appointments, and call your doctor if you are having problems. It's also a good idea to know your test results and keep a list of the medicines you take. How can you care for yourself at home? · Get plenty of rest if you feel tired. · Take an over-the-counter pain medicine if needed, such as acetaminophen (Tylenol), ibuprofen (Advil, Motrin), or naproxen (Aleve). Read and follow all instructions on the label. · Be careful when taking over-the-counter cold or flu medicines and Tylenol at the same time. Many of these medicines have acetaminophen, which is Tylenol. Read the labels to make sure that you are not taking more than the recommended dose. Too much acetaminophen (Tylenol) can be harmful. · Drink plenty of fluids, enough so that your urine is light yellow or clear like water. If you have kidney, heart, or liver disease and have to limit fluids, talk with your doctor before you increase the amount of fluids you drink. · Stay home from work, school, and other public places while you have a fever. When should you call for help? Call 911 anytime you think you may need emergency care.  For example, call if:    · You have severe trouble breathing.     · You passed out (lost consciousness).    Call your doctor now or seek immediate medical care if:    · You seem to be getting much sicker.     · You have a new or higher fever.     · You have blood in your stools.     · You have new belly pain, or your pain gets worse.     · You have a new rash.    Watch closely for changes in your health, and be sure to contact your doctor if:    · You start to get better and then get worse.     · You do not get better as expected. Where can you learn more? Go to http://marcio-vijay.info/  Enter L906 in the search box to learn more about \"Viral Infections: Care Instructions. \"  Current as of: January 26, 2020Content Version: 12.4  © 2601-8844 [employer]. Care instructions adapted under license by [employer] (which disclaims liability or warranty for this information). If you have questions about a medical condition or this instruction, always ask your healthcare professional. [place] [address] any warranty or liability for your use of this information. Advance Care Planning  People with COVID-19 may have no symptoms, mild symptoms, such as fever, cough, and shortness of breath or they may have more severe illness, developing severe and fatal pneumonia. As a result, Advance Care Planning with attention to naming a health care decision maker (someone you trust to make healthcare decisions for you if you could not speak for yourself) and sharing other health care preferences is important BEFORE a possible health crisis. Please contact your Primary Care Provider to discuss Advance Care Planning.      Preventing the Spread of Coronavirus Disease 2019 in Homes and Residential Communities  For the most recent information go to RetailCleaners.fi    Prevention steps for People with confirmed or suspected COVID-19 (including persons under investigation) who do not need to be hospitalized  and   People with confirmed COVID-19 who were hospitalized and determined to be medically stable to go home    Your healthcare provider and public health staff will evaluate whether you can be cared for at home. If it is determined that you do not need to be hospitalized and can be isolated at home, you will be monitored by staff from your local or state health department. You should follow the prevention steps below until a healthcare provider or local or state health department says you can return to your normal activities. Stay home except to get medical care  People who are mildly ill with COVID-19 are able to isolate at home during their illness. You should restrict activities outside your home, except for getting medical care. Do not go to work, school, or public areas. Avoid using public transportation, ride-sharing, or taxis. Separate yourself from other people and animals in your home  People: As much as possible, you should stay in a specific room and away from other people in your home. Also, you should use a separate bathroom, if available. Animals: You should restrict contact with pets and other animals while you are sick with COVID-19, just like you would around other people. Although there have not been reports of pets or other animals becoming sick with COVID-19, it is still recommended that people sick with COVID-19 limit contact with animals until more information is known about the virus. When possible, have another member of your household care for your animals while you are sick. If you are sick with COVID-19, avoid contact with your pet, including petting, snuggling, being kissed or licked, and sharing food. If you must care for your pet or be around animals while you are sick, wash your hands before and after you interact with pets and wear a facemask.   Call ahead before visiting your doctor  If you have a medical appointment, call the healthcare provider and tell them that you have or may have COVID-19. This will help the healthcare providers office take steps to keep other people from getting infected or exposed. Wear a facemask  You should wear a facemask when you are around other people (e.g., sharing a room or vehicle) or pets and before you enter a healthcare providers office. If you are not able to wear a facemask (for example, because it causes trouble breathing), then people who live with you should not stay in the same room with you, or they should wear a facemask if they enter your room. Cover your coughs and sneezes  Cover your mouth and nose with a tissue when you cough or sneeze. Throw used tissues in a lined trash can. Immediately wash your hands with soap and water for at least 20 seconds or, if soap and water are not available, clean your hands with an alcohol-based hand  that contains at least 60% alcohol. Clean your hands often  Wash your hands often with soap and water for at least 20 seconds, especially after blowing your nose, coughing, or sneezing; going to the bathroom; and before eating or preparing food. If soap and water are not readily available, use an alcohol-based hand  with at least 60% alcohol, covering all surfaces of your hands and rubbing them together until they feel dry. Soap and water are the best option if hands are visibly dirty. Avoid touching your eyes, nose, and mouth with unwashed hands. Avoid sharing personal household items  You should not share dishes, drinking glasses, cups, eating utensils, towels, or bedding with other people or pets in your home. After using these items, they should be washed thoroughly with soap and water. Clean all high-touch surfaces everyday  High touch surfaces include counters, tabletops, doorknobs, bathroom fixtures, toilets, phones, keyboards, tablets, and bedside tables.  Also, clean any surfaces that may have blood, stool, or body fluids on them. Use a household cleaning spray or wipe, according to the label instructions. Labels contain instructions for safe and effective use of the cleaning product including precautions you should take when applying the product, such as wearing gloves and making sure you have good ventilation during use of the product. Monitor your symptoms  Seek prompt medical attention if your illness is worsening (e.g., difficulty breathing). Before seeking care, call your healthcare provider and tell them that you have, or are being evaluated for, COVID-19. Put on a facemask before you enter the facility. These steps will help the healthcare providers office to keep other people in the office or waiting room from getting infected or exposed. Ask your healthcare provider to call the local or state health department. Persons who are placed under active monitoring or facilitated self-monitoring should follow instructions provided by their local health department or occupational health professionals, as appropriate. When working with your local health department check their available hours. If you have a medical emergency and need to call 911, notify the dispatch personnel that you have, or are being evaluated for COVID-19. If possible, put on a facemask before emergency medical services arrive. Discontinuing home isolation  Patients with confirmed COVID-19 should remain under home isolation precautions until the risk of secondary transmission to others is thought to be low. The decision to discontinue home isolation precautions should be made on a case-by-case basis, in consultation with healthcare providers and state and local health departments.

## 2020-05-04 NOTE — ED PROVIDER NOTES
Ángel Fox is a 31 y.o. female  who presents by private vehicle to ER with c/o Patient presents with:  Flu Like Symptoms    Patient presents with concerns for COVID-19. 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 Patient was seen by her PCP on Thursday and given a z pack. Patient reports no improvement of symptoms. Patient was seen at [facility] tonight and had negative chest xray, negative urine and mild elevation in WBC. Patient reports history of asthma, has been using inhaler with minimal relief. Patient reports fever of Tmax 102. Patient has been taking tylenol for fever last dose at 6pm.     She specifically denies any nausea, vomiting, chest pain,  rash, diarrhea, abdominal pain, urinary/bowel changes, sweating or weight loss. PCP: None   PMHx significant for: Past Medical History:  No date: Anxiety  No date: Asthma  No date: Depression   PSHx significant for: Past Surgical History:  No date: HX HERNIA REPAIR  Social Hx: Tobacco use: Social History    Tobacco Use      Smoking status: Current Every Day Smoker        Packs/day: 1.00      Smokeless tobacco: Never Used  ; EtOH use: The patient states she drinks 0 per week.; Illicit Drug use: Allergies:  No Known Allergies    There are no other complaints, changes or physical findings at this time. Past Medical History:   Diagnosis Date    Anxiety     Asthma     Depression        Past Surgical History:   Procedure Laterality Date    HX HERNIA REPAIR           No family history on file.     Social History     Socioeconomic History    Marital status: SINGLE     Spouse name: Not on file    Number of children: Not on file    Years of education: Not on file    Highest education level: Not on file   Occupational History    Not on file   Social Needs    Financial resource strain: Not on file    Food insecurity     Worry: Not on file     Inability: Not on file   [address] [facility] [address] Transportation needs     Medical: Not on file     Non-medical: Not on file   Tobacco Use    Smoking status: Current Every Day Smoker     Packs/day: 1.00    Smokeless tobacco: Never Used   Substance and Sexual Activity    Alcohol use: Not Currently    Drug use: Never    Sexual activity: Not on file   Lifestyle    Physical activity     Days per week: Not on file     Minutes per session: Not on file    Stress: Not on file   Relationships    Social connections     Talks on phone: Not on file     Gets together: Not on file     Attends [employer]ism service: Not on file     Active member of club or organization: Not on file     Attends meetings of clubs or organizations: Not on file     Relationship status: Not on file    Intimate partner violence     Fear of current or ex partner: Not on file     Emotionally abused: Not on file     Physically abused: Not on file     Forced sexual activity: Not on file   Other Topics Concern    Not on file   Social History Narrative    Not on file         ALLERGIES: Patient has no known allergies. Review of Systems   Constitutional: Positive for appetite change, chills and fatigue. Negative for activity change and fever. HENT: Positive for congestion and sore throat. Negative for rhinorrhea. Respiratory: Positive for cough and shortness of breath. Cardiovascular: Negative for chest pain and leg swelling. Gastrointestinal: Negative for abdominal pain, diarrhea, nausea and vomiting. Genitourinary: Negative for dysuria, vaginal bleeding and vaginal discharge. Musculoskeletal: Negative for arthralgias and myalgias. Neurological: Positive for headaches. Negative for dizziness. Psychiatric/Behavioral: Negative for confusion. All other systems reviewed and are negative. Vitals:    05/03/20 2128   Pulse: 85   Resp: 16   Temp: 98.4 °F (36.9 °C)   SpO2: 98%            Physical Exam  Constitutional:       Appearance: Normal appearance. She is well-developed.  She is not ill-appearing, toxic-appearing or diaphoretic. HENT:      Head: Normocephalic and atraumatic. Right Ear: Tympanic membrane and external ear normal.      Left Ear: Tympanic membrane and external ear normal.      Nose: Nose normal.      Mouth/Throat:      Pharynx: No oropharyngeal exudate. Eyes:      General: Lids are normal.         Right eye: No discharge. Left eye: No discharge. Conjunctiva/sclera: Conjunctivae normal.   Neck:      Musculoskeletal: Normal range of motion. Thyroid: No thyromegaly. Trachea: No tracheal deviation. Cardiovascular:      Rate and Rhythm: Normal rate and regular rhythm. Heart sounds: Normal heart sounds. Pulmonary:      Effort: Pulmonary effort is normal.      Breath sounds: Normal breath sounds. No decreased breath sounds, wheezing or rhonchi. Abdominal:      General: Bowel sounds are normal.      Palpations: Abdomen is soft. Tenderness: There is no abdominal tenderness. Musculoskeletal: Normal range of motion. Skin:     General: Skin is warm and dry. Neurological:      Mental Status: She is alert and oriented to person, place, and time. Psychiatric:         Judgment: Judgment normal.          MDM  Number of Diagnoses or Management Options  Viral infection:   Diagnosis management comments: Assesment/Plan- 31 y.o. Patient presents with:  Flu Like Symptoms  differential includes: COVID, viral illness, URI. Labs and imaging reviewed with covid pending. Patient is well appearing, afebrile and tolerating PO. Recommend PCP follow up. Patient educated on reasons to return to the ED. Procedures                       Hussein Waters was evaluated in the Emergency Department on 5/3/2020 for the symptoms described in the history of present illness.  He/she was evaluated in the context of the global COVID-19 pandemic, which necessitated consideration that the patient might be at risk for infection with the SARS-CoV-2 virus that causes COVID-19. Institutional protocols and algorithms that pertain to the evaluation of patients at risk for COVID-19 are in a state of rapid change based on information released by regulatory bodies including the CDC and federal and state organizations. These policies and algorithms were followed during the patient's care in the ED.

## 2020-05-04 NOTE — TELEPHONE ENCOUNTER
20 4:13 PM--reached patient after speaking first with her mother who verified her phone # since Ms. Joshua doesn't have a personal greeting in her VM. She is resting today but able to talk tomorrow afternoon. I did let her know her COVID-19 result is still pending but I will be sure to check again tomorrow for a final result before I call her. She is agreeable to this plan. 20 2:30 PM    Patient contacted regarding COVID-19  risk. Care Transition Nurse/ Ambulatory Care Manager contacted the patient by telephone to perform post discharge assessment. Verified name and  with patient as identifiers. Provided introduction to self, and explanation of the CTN/ACM role, and reason for call due to risk factors for infection and/or exposure to COVID-19. Symptoms reviewed with patient who verbalized the following symptoms: fever, fatigue, cough, shortness of breath, chills or shaking, sweating, dizziness/lightheadedness, no new symptoms and no worsening symptoms. Due to no new or worsening symptoms encounter was not routed to provider for escalation. Patient has following risk factors of: asthma and pneumonia. CTN/ACM reviewed discharge instructions, medical action plan and red flags such as increased shortness of breath, increasing fever and signs of decompensation with patient who verbalized understanding. Discussed exposure protocols and quarantine with CDC Guidelines What to do if you are sick with coronavirus disease .  Patient was given an opportunity for questions and concerns. The patient agrees to contact the [facility] exposure line [phone], local [facility]  ([phone]) and PCP office for questions related to their healthcare. CTN/ACM provided contact information for future needs.      Has active [employer] so sending the CDC COVID-19 guidance and our local and state phone resources to her via portal--reviewed what she will be receiving. Shared negative COVID results with patient and offered to help her further if she is unable to print her results from portal for her job. Reviewed and educated patient on any new and changed medications related to discharge diagnosis. Patient/family/caregiver given information for [employer] and agrees to enroll yes  Patient's preferred e-mail:  [email or web address]  Patient's preferred phone number: [phone]  Based on Loop alert triggers, patient will be contacted by nurse care manager for worsening symptoms. Pt will be further monitored by COVID Loop Team based on severity of symptoms and risk factors.

## 2020-05-04 NOTE — ED TRIAGE NOTES
Triage: [facility] with CC of fever, body aches, cough and shortness of breath since Friday. 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 Pt has a history of asthma. Tmax 102. Has been taking Tylenol 3 for fevers. Last dose at 6pm. Concerned for Covid-19.

## 2020-05-05 ENCOUNTER — PATIENT MESSAGE (OUTPATIENT)
Dept: CASE MANAGEMENT | Age: 30
End: 2020-05-05

## 2020-05-05 LAB — SARS-COV-2, COV2NT: NOT DETECTED

## 2020-12-31 ENCOUNTER — HOSPITAL ENCOUNTER (EMERGENCY)
Age: 30
Discharge: HOME | End: 2020-12-31
Attending: EMERGENCY MEDICINE
Payer: MEDICAID

## 2020-12-31 ENCOUNTER — APPOINTMENT (OUTPATIENT)
Dept: GENERAL RADIOLOGY | Age: 30
End: 2020-12-31
Attending: EMERGENCY MEDICINE
Payer: MEDICAID

## 2020-12-31 VITALS
HEIGHT: 69 IN | OXYGEN SATURATION: 97 % | SYSTOLIC BLOOD PRESSURE: 117 MMHG | HEART RATE: 113 BPM | WEIGHT: 186.07 LBS | RESPIRATION RATE: 16 BRPM | DIASTOLIC BLOOD PRESSURE: 75 MMHG | BODY MASS INDEX: 27.56 KG/M2 | TEMPERATURE: 98.4 F

## 2020-12-31 DIAGNOSIS — R05.9 COUGH: Primary | ICD-10-CM

## 2020-12-31 DIAGNOSIS — Z20.822 COVID-19 VIRUS TEST RESULT UNKNOWN: ICD-10-CM

## 2020-12-31 DIAGNOSIS — R00.0 TACHYCARDIA: ICD-10-CM

## 2020-12-31 LAB
ANION GAP SERPL CALC-SCNC: 13 MMOL/L (ref 5–15)
ATRIAL RATE: 109 BPM
BASOPHILS # BLD: 0 K/UL (ref 0–0.1)
BASOPHILS NFR BLD: 0 % (ref 0–1)
BUN SERPL-MCNC: 13 MG/DL (ref 6–20)
BUN/CREAT SERPL: 14 (ref 12–20)
CALCIUM SERPL-MCNC: 8.8 MG/DL (ref 8.5–10.1)
CALCULATED P AXIS, ECG09: 63 DEGREES
CALCULATED R AXIS, ECG10: 63 DEGREES
CALCULATED T AXIS, ECG11: 30 DEGREES
CHLORIDE SERPL-SCNC: 104 MMOL/L (ref 97–108)
CO2 SERPL-SCNC: 23 MMOL/L (ref 21–32)
CREAT SERPL-MCNC: 0.9 MG/DL (ref 0.55–1.02)
D DIMER PPP FEU-MCNC: <0.19 MG/L FEU (ref 0–0.65)
DIAGNOSIS, 93000: NORMAL
DIFFERENTIAL METHOD BLD: ABNORMAL
EOSINOPHIL # BLD: 0 K/UL (ref 0–0.4)
EOSINOPHIL NFR BLD: 0 % (ref 0–7)
ERYTHROCYTE [DISTWIDTH] IN BLOOD BY AUTOMATED COUNT: 12.6 % (ref 11.5–14.5)
GLUCOSE SERPL-MCNC: 238 MG/DL (ref 65–100)
HCG UR QL: NEGATIVE
HCT VFR BLD AUTO: 41.1 % (ref 35–47)
HGB BLD-MCNC: 13.2 G/DL (ref 11.5–16)
IMM GRANULOCYTES # BLD AUTO: 0.1 K/UL (ref 0–0.04)
IMM GRANULOCYTES NFR BLD AUTO: 1 % (ref 0–0.5)
LYMPHOCYTES # BLD: 0.6 K/UL (ref 0.8–3.5)
LYMPHOCYTES NFR BLD: 7 % (ref 12–49)
MCH RBC QN AUTO: 30.8 PG (ref 26–34)
MCHC RBC AUTO-ENTMCNC: 32.1 G/DL (ref 30–36.5)
MCV RBC AUTO: 96 FL (ref 80–99)
MONOCYTES # BLD: 0.1 K/UL (ref 0–1)
MONOCYTES NFR BLD: 1 % (ref 5–13)
NEUTS SEG # BLD: 7.9 K/UL (ref 1.8–8)
NEUTS SEG NFR BLD: 91 % (ref 32–75)
NRBC # BLD: 0 K/UL (ref 0–0.01)
NRBC BLD-RTO: 0 PER 100 WBC
P-R INTERVAL, ECG05: 130 MS
PLATELET # BLD AUTO: 223 K/UL (ref 150–400)
PMV BLD AUTO: 10.8 FL (ref 8.9–12.9)
POTASSIUM SERPL-SCNC: 3.6 MMOL/L (ref 3.5–5.1)
Q-T INTERVAL, ECG07: 320 MS
QRS DURATION, ECG06: 86 MS
QTC CALCULATION (BEZET), ECG08: 430 MS
RBC # BLD AUTO: 4.28 M/UL (ref 3.8–5.2)
RBC MORPH BLD: ABNORMAL
SODIUM SERPL-SCNC: 140 MMOL/L (ref 136–145)
TROPONIN I SERPL-MCNC: <0.05 NG/ML
VENTRICULAR RATE, ECG03: 109 BPM
WBC # BLD AUTO: 8.7 K/UL (ref 3.6–11)

## 2020-12-31 PROCEDURE — 71045 X-RAY EXAM CHEST 1 VIEW: CPT

## 2020-12-31 PROCEDURE — 99284 EMERGENCY DEPT VISIT MOD MDM: CPT

## 2020-12-31 PROCEDURE — 36415 COLL VENOUS BLD VENIPUNCTURE: CPT

## 2020-12-31 PROCEDURE — 80048 BASIC METABOLIC PNL TOTAL CA: CPT

## 2020-12-31 PROCEDURE — 87635 SARS-COV-2 COVID-19 AMP PRB: CPT

## 2020-12-31 PROCEDURE — 81025 URINE PREGNANCY TEST: CPT

## 2020-12-31 PROCEDURE — 84484 ASSAY OF TROPONIN QUANT: CPT

## 2020-12-31 PROCEDURE — 93005 ELECTROCARDIOGRAM TRACING: CPT

## 2020-12-31 PROCEDURE — 85025 COMPLETE CBC W/AUTO DIFF WBC: CPT

## 2020-12-31 PROCEDURE — 85379 FIBRIN DEGRADATION QUANT: CPT

## 2020-12-31 RX ORDER — DEXTROAMPHETAMINE SACCHARATE, AMPHETAMINE ASPARTATE, DEXTROAMPHETAMINE SULFATE AND AMPHETAMINE SULFATE 5; 5; 5; 5 MG/1; MG/1; MG/1; MG/1
20 TABLET ORAL
COMMUNITY
End: 2021-05-06

## 2020-12-31 RX ORDER — DEXTROAMPHETAMINE SACCHARATE, AMPHETAMINE ASPARTATE, DEXTROAMPHETAMINE SULFATE AND AMPHETAMINE SULFATE 5; 5; 5; 5 MG/1; MG/1; MG/1; MG/1
20 TABLET ORAL EVERY MORNING
COMMUNITY

## 2020-12-31 RX ORDER — ALBUTEROL SULFATE 2.5 MG/.5ML
2.5 SOLUTION RESPIRATORY (INHALATION) ONCE
COMMUNITY
End: 2022-07-18

## 2020-12-31 RX ORDER — FLUOXETINE HYDROCHLORIDE 40 MG/1
40 CAPSULE ORAL DAILY
COMMUNITY

## 2020-12-31 RX ORDER — MEDROXYPROGESTERONE ACETATE 150 MG/ML
150 INJECTION, SUSPENSION INTRAMUSCULAR ONCE
COMMUNITY
End: 2021-05-06

## 2020-12-31 NOTE — ED TRIAGE NOTES
Pt states that her co-worker was exposed to covid and a couple of days ago she began to have a cough.

## 2020-12-31 NOTE — ED PROVIDER NOTES
72-year-old female history of asthma, anxiety, presenting to the ED for evaluation of cough. Patient reports her symptoms started 3 to 4 days ago but intensified this morning. She reports a cough productive of mucus. She is also experienced nasal congestion and intermittent rhinorrhea. 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 She reports substernal chest pain, worse with inspiration, nonradiating. She tells me her symptoms feel somewhat different than her typical asthma episodes. She did take her albuterol inhaler at home as well as prednisone with little relief. Past Medical History:   Diagnosis Date    Anxiety     Asthma     Depression        Past Surgical History:   Procedure Laterality Date    HX HERNIA REPAIR           No family history on file.     Social History     Socioeconomic History    Marital status: SINGLE     Spouse name: Not on file    Number of children: Not on file    Years of education: Not on file    Highest education level: Not on file   Occupational History    Not on file   Social Needs    Financial resource strain: Not on file    Food insecurity     Worry: Not on file     Inability: Not on file    Transportation needs     Medical: Not on file     Non-medical: Not on file   Tobacco Use    Smoking status: Current Every Day Smoker     Packs/day: 1.00    Smokeless tobacco: Never Used   Substance and Sexual Activity    Alcohol use: Not Currently    Drug use: Never    Sexual activity: Not on file   Lifestyle    Physical activity     Days per week: Not on file     Minutes per session: Not on file    Stress: Not on file   Relationships    Social connections     Talks on phone: Not on file     Gets together: Not on file     Attends Muslim service: Not on file     Active member of club or organization: Not on file     Attends meetings of clubs or organizations: Not on file Relationship status: Not on file    Intimate partner violence     Fear of current or ex partner: Not on file     Emotionally abused: Not on file     Physically abused: Not on file     Forced sexual activity: Not on file   Other Topics Concern    Not on file   Social History Narrative    Not on file         ALLERGIES: Patient has no known allergies. Review of Systems   Constitutional: Negative for chills and fever. HENT: Positive for congestion, rhinorrhea and voice change. Negative for sore throat and trouble swallowing. Eyes: Negative for visual disturbance. Respiratory: Positive for cough, chest tightness, shortness of breath and wheezing. Cardiovascular: Positive for chest pain and palpitations. Gastrointestinal: Negative for abdominal pain, nausea and vomiting. Genitourinary: Negative for dysuria and hematuria. Musculoskeletal: Negative for back pain. Skin: Negative for rash. Allergic/Immunologic: Negative for immunocompromised state. Neurological: Negative for syncope, weakness and headaches. Psychiatric/Behavioral: Negative for confusion. Vitals:    12/31/20 0737   BP: (!) 144/69   Pulse: (!) 118   Resp: 16   Temp: (!) 96.5 °F (35.8 °C)   SpO2: 98%            Physical Exam  Vitals signs and nursing note reviewed. Constitutional:       General: She is not in acute distress. Appearance: She is well-developed. HENT:      Head: Normocephalic and atraumatic. Eyes:      General: No scleral icterus. Neck:      Trachea: No tracheal deviation. Cardiovascular:      Rate and Rhythm: Regular rhythm. Tachycardia present. Heart sounds: Normal heart sounds. Pulmonary:      Effort: Pulmonary effort is normal. No respiratory distress. Abdominal:      General: There is no distension. Palpations: Abdomen is soft. Tenderness: There is no abdominal tenderness. Musculoskeletal: Normal range of motion. Skin:     General: Skin is warm and dry.    Neurological: Mental Status: She is alert and oriented to person, place, and time. Cranial Nerves: No cranial nerve deficit. MDM  Number of Diagnoses or Management Options  Diagnosis management comments: 82-year-old female history of asthma and anxiety, works in healthcare, presenting secondary to 3 days of cough, concern for possible COVID-19. Of note, patient is tachycardic, on my exam up to 130, she has no history of recent travel, but does use hormonal birth control. Will obtain D-dimer and EKG. Regarding treatment of symptoms, her SPO2 is 99%, she is afebrile, and tachycardic however I think the tachycardia is likely secondary to albuterol use. ED EKG interpretation:  Rhythm: sinus tachycardia; and regular . Rate (approx.): 109; Axis: normal; P wave: normal; QRS interval: normal ; ST/T wave: normal; in  Lead: NA; Other findings: none. EKG interpreted by Merry Dykes ED MD.         Amount and/or Complexity of Data Reviewed  Clinical lab tests: ordered  Tests in the radiology section of CPT®: ordered           Procedures    9:07 AM  Pt requesting discharge. Recent Results (from the past 12 hour(s))   EKG, 12 LEAD, INITIAL    Collection Time: 12/31/20  7:56 AM   Result Value Ref Range    Ventricular Rate 109 BPM    Atrial Rate 109 BPM    P-R Interval 130 ms    QRS Duration 86 ms    Q-T Interval 320 ms    QTC Calculation (Bezet) 430 ms    Calculated P Axis 63 degrees    Calculated R Axis 63 degrees    Calculated T Axis 30 degrees    Diagnosis       Sinus tachycardia  Otherwise normal ECG  When compared with ECG of 27-MAY-2019 18:03,  Vent.  rate has increased BY  61 BPM  T wave inversion now evident in Inferior leads  Confirmed by Kerri Guido MD, Mere Sheppard ([id number]) on 12/31/2020 9:06:06 AM     TROPONIN I    Collection Time: 12/31/20  7:57 AM   Result Value Ref Range    Troponin-I, Qt. <0.05 <2.85 ng/mL   METABOLIC PANEL, BASIC    Collection Time: 12/31/20  7:57 AM   Result Value Ref Range    Sodium 140 136 - 145 mmol/L    Potassium 3.6 3.5 - 5.1 mmol/L    Chloride 104 97 - 108 mmol/L    CO2 23 21 - 32 mmol/L    Anion gap 13 5 - 15 mmol/L    Glucose 238 (H) 65 - 100 mg/dL    BUN 13 6 - 20 MG/DL    Creatinine 0.90 0.55 - 1.02 MG/DL    BUN/Creatinine ratio 14 12 - 20      GFR est AA >60 >60 ml/min/1.73m2    GFR est non-AA >60 >60 ml/min/1.73m2    Calcium 8.8 8.5 - 10.1 MG/DL   D DIMER    Collection Time: 12/31/20  7:57 AM   Result Value Ref Range    D-dimer <0.19 0.00 - 0.65 mg/L FEU   CBC WITH AUTOMATED DIFF    Collection Time: 12/31/20  7:57 AM   Result Value Ref Range    WBC 8.7 3.6 - 11.0 K/uL    RBC 4.28 3.80 - 5.20 M/uL    HGB 13.2 11.5 - 16.0 g/dL    HCT 41.1 35.0 - 47.0 %    MCV 96.0 80.0 - 99.0 FL    MCH 30.8 26.0 - 34.0 PG    MCHC 32.1 30.0 - 36.5 g/dL    RDW 12.6 11.5 - 14.5 %    PLATELET 433 929 - 162 K/uL    MPV 10.8 8.9 - 12.9 FL    NRBC 0.0 0  WBC    ABSOLUTE NRBC 0.00 0.00 - 0.01 K/uL    NEUTROPHILS 91 (H) 32 - 75 %    LYMPHOCYTES 7 (L) 12 - 49 %    MONOCYTES 1 (L) 5 - 13 %    EOSINOPHILS 0 0 - 7 %    BASOPHILS 0 0 - 1 %    IMMATURE GRANULOCYTES 1 (H) 0.0 - 0.5 %    ABS. NEUTROPHILS 7.9 1.8 - 8.0 K/UL    ABS. LYMPHOCYTES 0.6 (L) 0.8 - 3.5 K/UL    ABS. MONOCYTES 0.1 0.0 - 1.0 K/UL    ABS. EOSINOPHILS 0.0 0.0 - 0.4 K/UL    ABS. BASOPHILS 0.0 0.0 - 0.1 K/UL    ABS. IMM. GRANS. 0.1 (H) 0.00 - 0.04 K/UL    DF SMEAR SCANNED      RBC COMMENTS NORMOCYTIC, NORMOCHROMIC     SARS-COV-2    Collection Time: 12/31/20  7:57 AM   Result Value Ref Range    Specimen source Nasopharyngeal      SARS-CoV-2 PENDING     SARS-CoV-2 PENDING     Specimen source Nasopharyngeal      COVID-19 rapid test PENDING     Specimen type NP Swab      Health status PENDING     COVID-19 PENDING    HCG URINE, QL. - POC    Collection Time: 12/31/20  8:33 AM   Result Value Ref Range    Pregnancy test,urine (POC) Negative NEG       Glucose elevated, but not fasting measurement.   CXR not yet read by Radiologist but normal by my wet read. Will have pt F/U with PCP.      Signed By: Vidhi Ardon MD     December 31, 2020

## 2020-12-31 NOTE — Clinical Note
[address] 
[facility] EM[facility] 
[address] [address] [phone] Work/School Note Date: 12/31/2020 To Whom It May concern: 
 
Marni Devlin was evaulated by the following provider(s): 
Attending Provider: Jeff Dias MD.   Farzana Cordon virus is suspected. Per the CDC guidelines we recommend home isolation until the following conditions are all met: 1. At least 10 days have passed since symptoms first appeared and 2. At least 24 hours have passed since last fever without the use of fever-reducing medications and 
3. Symptoms (e.g., cough, shortness of breath) have improved Sincerely, Pancho Tsang MD

## 2020-12-31 NOTE — LETTER
Dear Amirah Urias Patient, Thank you for entrusting us with your care. Here is how you can expect to hear about your test results:   If your COVID-19 test is positive, you will receive a phone call from a member     of our team, and your results will be available in [address], our secure patient     portal. 
   If your COVID-19 test is negative, your results will be available on [employer],       our secure patient portal. If your employer is requiring you to show proof of     your negative test result, you'll be able to download and print off the test result     record. If you already have a [email or web address], visit [employer] and click Log in to [employer] to view any test results. If you don't have a [email or web address], you can register online by visiting [employer] and clicking Sign up for [employer]. Thank you for choosing [facility] for your care. [facility]

## 2021-01-04 ENCOUNTER — PATIENT OUTREACH (OUTPATIENT)
Dept: CASE MANAGEMENT | Age: 31
End: 2021-01-04

## 2021-01-04 LAB
COVID-19, XGCOVT: NOT DETECTED
SOURCE, COVRS: NORMAL
SPECIMEN SOURCE, FCOV2M: NORMAL
SPECIMEN TYPE, XMCV1T: NORMAL

## 2021-01-05 NOTE — PROGRESS NOTES
ACM attempted to follow up with patient after recent ED visit to provide education regarding COVID-19 symptoms and precautions. Attempts to reach patient were unsuccessful. A [employer] patient portal message was sent providing education and resources concerning COVID-19. ACM also included contact information. Episode of care will be resolved.    Mima Murillo RN  Ambulatory Care Manager

## 2021-05-06 ENCOUNTER — HOSPITAL ENCOUNTER (EMERGENCY)
Age: 31
Discharge: HOME | End: 2021-05-06
Attending: EMERGENCY MEDICINE
Payer: MEDICAID

## 2021-05-06 VITALS
RESPIRATION RATE: 16 BRPM | DIASTOLIC BLOOD PRESSURE: 83 MMHG | HEIGHT: 69 IN | HEART RATE: 105 BPM | OXYGEN SATURATION: 99 % | SYSTOLIC BLOOD PRESSURE: 132 MMHG | TEMPERATURE: 98.2 F | BODY MASS INDEX: 29.75 KG/M2 | WEIGHT: 200.84 LBS

## 2021-05-06 DIAGNOSIS — L02.31 LEFT BUTTOCK ABSCESS: Primary | ICD-10-CM

## 2021-05-06 PROCEDURE — 99283 EMERGENCY DEPT VISIT LOW MDM: CPT

## 2021-05-06 PROCEDURE — 74011000250 HC RX REV CODE- 250: Performed by: EMERGENCY MEDICINE

## 2021-05-06 PROCEDURE — 75810000289 HC I&D ABSCESS SIMP/COMP/MULT

## 2021-05-06 RX ORDER — LIDOCAINE HYDROCHLORIDE 10 MG/ML
10 INJECTION INFILTRATION; PERINEURAL ONCE
Status: COMPLETED | OUTPATIENT
Start: 2021-05-06 | End: 2021-05-06

## 2021-05-06 RX ADMIN — LIDOCAINE HYDROCHLORIDE 10 ML: 10 INJECTION, SOLUTION INFILTRATION; PERINEURAL at 20:49

## 2021-05-06 NOTE — ED TRIAGE NOTES
Pt arrives ambulatory to ed with complaints of abscess to left upper thigh/buttocks x one week. Pt denies any drainage. Redness and tenderness noted. Denies any fevers.

## 2021-05-06 NOTE — LETTER
[address] 
[facility] EM[facility] 
[address] [address] 
[phone] Work/School Note Date: 5/6/2021 To Whom It May concern: 
 
Dusty Martines was seen and treated today in the emergency room by the following provider(s): 
Attending Provider: Anson Larios MD. Dusty Martines may return to work on 5/10/21. Sincerely, Keyla Henderson MD

## 2021-05-07 NOTE — ED PROVIDER NOTES
History of anxiety, depression, asthma. She presents accompanied by her boyfriend with complaints of a suspected abscess to her left buttock region. She first noted it about 1-1/2 weeks ago. It has become more swollen, red, and painful since then. No drainage. No fever or other systemic symptoms. She has had to have abscesses drained before per patient. The pain is moderate without relieving factors. Past Medical History:   Diagnosis Date    Anxiety     Asthma     Depression        Past Surgical History:   Procedure Laterality Date    HX HERNIA REPAIR      [place] BREAST SURGERY PROCEDURE UNLISTED      Cyst removed         History reviewed. No pertinent family history. Social History     Socioeconomic History    Marital status: SINGLE     Spouse name: Not on file    Number of children: Not on file    Years of education: Not on file    Highest education level: Not on file   Occupational History    Not on file   Social Needs    Financial resource strain: Not on file    Food insecurity     Worry: Not on file     Inability: Not on file    Transportation needs     Medical: Not on file     Non-medical: Not on file   Tobacco Use    Smoking status: Current Every Day Smoker     Packs/day: 1.00    Smokeless tobacco: Never Used   Substance and Sexual Activity    Alcohol use:  Yes    Drug use: Never    Sexual activity: Not on file   Lifestyle    Physical activity     Days per week: Not on file     Minutes per session: Not on file    Stress: Not on file   Relationships    Social connections     Talks on phone: Not on file     Gets together: Not on file     Attends Mormon service: Not on file     Active member of club or organization: Not on file     Attends meetings of clubs or organizations: Not on file     Relationship status: Not on file    Intimate partner violence     Fear of current or ex partner: Not on file     Emotionally abused: Not on file     Physically abused: Not on file Forced sexual activity: Not on file   Other Topics Concern    Not on file   Social History Narrative    Not on file         ALLERGIES: Patient has no known allergies. Review of Systems   All other systems reviewed and are negative. Vitals:    05/06/21 2003   BP: 132/83   Pulse: (!) 105   Resp: 16   Temp: 98.2 °F (36.8 °C)   SpO2: 99%   Weight: 91.1 kg (200 lb 13.4 oz)   Height: 5' 9\" (1.753 m)            Physical Exam  Vitals signs and nursing note reviewed. Constitutional:       Appearance: She is well-developed. HENT:      Head: Normocephalic and atraumatic. Eyes:      Conjunctiva/sclera: Conjunctivae normal.   Neck:      Trachea: No tracheal deviation. Cardiovascular:      Rate and Rhythm: Normal rate. Pulmonary:      Effort: Pulmonary effort is normal.   Abdominal:      General: There is no distension. Skin:     General: Skin is dry. Comments: Silver dollar sized abscess left lateral buttock region with fluctuance. Neurological:      Mental Status: She is alert. [facility]       I&D [facility]    Date/Time: 5/6/2021 9:30 PM  Performed by: Sean Lee MD  Authorized by: Sean Lee MD     Consent:     Consent obtained:  Verbal    Consent given by:  Patient    Risks discussed:  Pain  Location:     Type:  Abscess    Size:  4 cm    Location:  Lower extremity    Lower extremity location:  Buttock    Buttock location:  L buttock  Pre-procedure details:     Skin preparation:  Betadine  Anesthesia (see MAR for exact dosages): Anesthesia method:  Local infiltration    Local anesthetic:  Lidocaine 1% w/o epi  Procedure type:     Complexity:  Complex  Procedure details:     Incision types:  Single with marsupialization    Scalpel blade:  11    Wound management:  Probed and deloculated    Drainage:  Purulent    Drainage amount:  Copious    Packing materials:  1/4 in iodoform gauze  Post-procedure details:     Patient tolerance of procedure:   Tolerated well, no immediate complications      Assessment/plan: Left buttock abscess -I&D with good results. Home with return precautions. Wound check/packing removal in 2 days.   Puneet Smith MD

## 2022-07-18 ENCOUNTER — APPOINTMENT (OUTPATIENT)
Dept: GENERAL RADIOLOGY | Age: 32
End: 2022-07-18
Attending: EMERGENCY MEDICINE
Payer: MEDICAID

## 2022-07-18 ENCOUNTER — HOSPITAL ENCOUNTER (EMERGENCY)
Age: 32
Discharge: HOME | End: 2022-07-18
Attending: EMERGENCY MEDICINE
Payer: MEDICAID

## 2022-07-18 VITALS
OXYGEN SATURATION: 100 % | HEIGHT: 69 IN | BODY MASS INDEX: 30.04 KG/M2 | HEART RATE: 89 BPM | SYSTOLIC BLOOD PRESSURE: 111 MMHG | WEIGHT: 202.82 LBS | RESPIRATION RATE: 18 BRPM | TEMPERATURE: 97.9 F | DIASTOLIC BLOOD PRESSURE: 71 MMHG

## 2022-07-18 DIAGNOSIS — R30.0 DYSURIA: ICD-10-CM

## 2022-07-18 DIAGNOSIS — R51.9 NONINTRACTABLE HEADACHE, UNSPECIFIED CHRONICITY PATTERN, UNSPECIFIED HEADACHE TYPE: ICD-10-CM

## 2022-07-18 DIAGNOSIS — R05.9 COUGH: Primary | ICD-10-CM

## 2022-07-18 LAB
ALBUMIN SERPL-MCNC: 3.1 G/DL (ref 3.5–5)
ALBUMIN/GLOB SERPL: 0.9 {RATIO} (ref 1.1–2.2)
ALP SERPL-CCNC: 65 U/L (ref 45–117)
ALT SERPL-CCNC: 17 U/L (ref 12–78)
ANION GAP SERPL CALC-SCNC: 6 MMOL/L (ref 5–15)
APPEARANCE UR: ABNORMAL
AST SERPL-CCNC: 10 U/L (ref 15–37)
BACTERIA URNS QL MICRO: ABNORMAL /HPF
BASOPHILS # BLD: 0 K/UL (ref 0–0.1)
BASOPHILS NFR BLD: 0 % (ref 0–1)
BILIRUB SERPL-MCNC: 0.2 MG/DL (ref 0.2–1)
BILIRUB UR QL: NEGATIVE
BUN SERPL-MCNC: 12 MG/DL (ref 6–20)
BUN/CREAT SERPL: 17 (ref 12–20)
CALCIUM SERPL-MCNC: 8.8 MG/DL (ref 8.5–10.1)
CHLORIDE SERPL-SCNC: 104 MMOL/L (ref 97–108)
CO2 SERPL-SCNC: 28 MMOL/L (ref 21–32)
COLOR UR: ABNORMAL
COVID-19 RAPID TEST, COVR: NOT DETECTED
CREAT SERPL-MCNC: 0.69 MG/DL (ref 0.55–1.02)
DIFFERENTIAL METHOD BLD: ABNORMAL
EOSINOPHIL # BLD: 0.1 K/UL (ref 0–0.4)
EOSINOPHIL NFR BLD: 1 % (ref 0–7)
EPITH CASTS URNS QL MICRO: ABNORMAL /LPF
ERYTHROCYTE [DISTWIDTH] IN BLOOD BY AUTOMATED COUNT: 13.2 % (ref 11.5–14.5)
GLOBULIN SER CALC-MCNC: 3.6 G/DL (ref 2–4)
GLUCOSE SERPL-MCNC: 130 MG/DL (ref 65–100)
GLUCOSE UR STRIP.AUTO-MCNC: NEGATIVE MG/DL
HCG UR QL: NEGATIVE
HCT VFR BLD AUTO: 38 % (ref 35–47)
HGB BLD-MCNC: 12.3 G/DL (ref 11.5–16)
HGB UR QL STRIP: ABNORMAL
IMM GRANULOCYTES # BLD AUTO: 0 K/UL (ref 0–0.04)
IMM GRANULOCYTES NFR BLD AUTO: 1 % (ref 0–0.5)
KETONES UR QL STRIP.AUTO: NEGATIVE MG/DL
LACTATE SERPL-SCNC: 0.7 MMOL/L (ref 0.4–2)
LEUKOCYTE ESTERASE UR QL STRIP.AUTO: ABNORMAL
LYMPHOCYTES # BLD: 2.2 K/UL (ref 0.8–3.5)
LYMPHOCYTES NFR BLD: 25 % (ref 12–49)
MCH RBC QN AUTO: 30.4 PG (ref 26–34)
MCHC RBC AUTO-ENTMCNC: 32.4 G/DL (ref 30–36.5)
MCV RBC AUTO: 94.1 FL (ref 80–99)
MONOCYTES # BLD: 0.5 K/UL (ref 0–1)
MONOCYTES NFR BLD: 5 % (ref 5–13)
MUCOUS THREADS URNS QL MICRO: ABNORMAL /LPF
NEUTS SEG # BLD: 5.9 K/UL (ref 1.8–8)
NEUTS SEG NFR BLD: 68 % (ref 32–75)
NITRITE UR QL STRIP.AUTO: NEGATIVE
NRBC # BLD: 0 K/UL (ref 0–0.01)
NRBC BLD-RTO: 0 PER 100 WBC
PH UR STRIP: 6 [PH] (ref 5–8)
PLATELET # BLD AUTO: 233 K/UL (ref 150–400)
PMV BLD AUTO: 10.5 FL (ref 8.9–12.9)
POTASSIUM SERPL-SCNC: 3.5 MMOL/L (ref 3.5–5.1)
PROT SERPL-MCNC: 6.7 G/DL (ref 6.4–8.2)
PROT UR STRIP-MCNC: NEGATIVE MG/DL
RBC # BLD AUTO: 4.04 M/UL (ref 3.8–5.2)
RBC #/AREA URNS HPF: ABNORMAL /HPF (ref 0–5)
SODIUM SERPL-SCNC: 138 MMOL/L (ref 136–145)
SOURCE, COVRS: NORMAL
SP GR UR REFRACTOMETRY: 1.02 (ref 1–1.03)
UR CULT HOLD, URHOLD: NORMAL
UROBILINOGEN UR QL STRIP.AUTO: 0.2 EU/DL (ref 0.2–1)
WBC # BLD AUTO: 8.6 K/UL (ref 3.6–11)
WBC URNS QL MICRO: ABNORMAL /HPF (ref 0–4)

## 2022-07-18 PROCEDURE — 80053 COMPREHEN METABOLIC PANEL: CPT

## 2022-07-18 PROCEDURE — 85025 COMPLETE CBC W/AUTO DIFF WBC: CPT

## 2022-07-18 PROCEDURE — 87635 SARS-COV-2 COVID-19 AMP PRB: CPT

## 2022-07-18 PROCEDURE — 87086 URINE CULTURE/COLONY COUNT: CPT

## 2022-07-18 PROCEDURE — 71045 X-RAY EXAM CHEST 1 VIEW: CPT

## 2022-07-18 PROCEDURE — 87147 CULTURE TYPE IMMUNOLOGIC: CPT

## 2022-07-18 PROCEDURE — 96374 THER/PROPH/DIAG INJ IV PUSH: CPT

## 2022-07-18 PROCEDURE — 83605 ASSAY OF LACTIC ACID: CPT

## 2022-07-18 PROCEDURE — 81001 URINALYSIS AUTO W/SCOPE: CPT

## 2022-07-18 PROCEDURE — 74011250636 HC RX REV CODE- 250/636: Performed by: EMERGENCY MEDICINE

## 2022-07-18 PROCEDURE — 99284 EMERGENCY DEPT VISIT MOD MDM: CPT

## 2022-07-18 PROCEDURE — 36415 COLL VENOUS BLD VENIPUNCTURE: CPT

## 2022-07-18 PROCEDURE — 74011250637 HC RX REV CODE- 250/637: Performed by: EMERGENCY MEDICINE

## 2022-07-18 PROCEDURE — 81025 URINE PREGNANCY TEST: CPT

## 2022-07-18 RX ORDER — CEPHALEXIN 250 MG/1
500 CAPSULE ORAL
Status: COMPLETED | OUTPATIENT
Start: 2022-07-18 | End: 2022-07-18

## 2022-07-18 RX ORDER — KETOROLAC TROMETHAMINE 30 MG/ML
15 INJECTION, SOLUTION INTRAMUSCULAR; INTRAVENOUS
Status: COMPLETED | OUTPATIENT
Start: 2022-07-18 | End: 2022-07-18

## 2022-07-18 RX ORDER — CEPHALEXIN 500 MG/1
500 CAPSULE ORAL 3 TIMES DAILY
Qty: 21 CAPSULE | Refills: 0 | Status: SHIPPED | OUTPATIENT
Start: 2022-07-18 | End: 2022-07-25

## 2022-07-18 RX ADMIN — SODIUM CHLORIDE 1000 ML: 9 INJECTION, SOLUTION INTRAVENOUS at 21:17

## 2022-07-18 RX ADMIN — CEPHALEXIN 500 MG: 250 CAPSULE ORAL at 22:46

## 2022-07-18 RX ADMIN — KETOROLAC TROMETHAMINE 15 MG: 30 INJECTION, SOLUTION INTRAMUSCULAR; INTRAVENOUS at 21:17

## 2022-07-18 NOTE — ED TRIAGE NOTES
Patient presents ambulatory to triage with mother. Pt complains of headache; onset Friday. Pt reports taking \"BC Powder, Excedrin,\" with no relief. + productive cough with sputum x 1 week. Dysuria x 2 days. Pt reports taking multiple home Covid tests that were negative. Additionally, pt complains of hypotension, reports her bp at home was 102/70.

## 2022-07-19 NOTE — ED PROVIDER NOTES
HPI   Healthy 53-year-old female who presents to the emergency department due to headache, dysuria, as well as cough. Her symptoms started on Friday with a dull headache. The next day she developed a cough of yellow sputum. For the last 2 days she has also had some dysuria. She denies any shortness of breath. No vomiting or diarrhea. No abdominal pain. No known sick contacts. She says she has had COVID test at home that were negative. She has been taking some BC powder and Excedrin with no relief of her symptoms. She denies any hematuria or flank pain. She says she has had kidney stones and these feel different. She also said her blood pressure was 102/70 at home and she was concerned this was low for her. Past Medical History:   Diagnosis Date    Anxiety     Asthma     Depression        Past Surgical History:   Procedure Laterality Date    HX HERNIA REPAIR      [place] BREAST SURGERY PROCEDURE UNLISTED      Cyst removed         History reviewed. No pertinent family history. Social History     Socioeconomic History    Marital status: SINGLE     Spouse name: Not on file    Number of children: Not on file    Years of education: Not on file    Highest education level: Not on file   Occupational History    Not on file   Tobacco Use    Smoking status: Current Every Day Smoker     Packs/day: 1.00    Smokeless tobacco: Never Used   Substance and Sexual Activity    Alcohol use: Yes     Comment: occassionally     Drug use: Never    Sexual activity: Not on file   Other Topics Concern    Not on file   Social History Narrative    Not on file     Social Determinants of Health     Financial Resource Strain:     Difficulty of Paying Living Expenses: Not on file   Food Insecurity:     Worried About Running Out of Food in the Last Year: Not on file    Sandra of Food in the Last Year: Not on file   Transportation Needs:     Lack of Transportation (Medical):  Not on file    Lack of Transportation (Non-Medical): Not on file   Physical Activity:     Days of Exercise per Week: Not on file    Minutes of Exercise per Session: Not on file   Stress:     Feeling of Stress : Not on file   Social Connections:     Frequency of Communication with Friends and Family: Not on file    Frequency of Social Gatherings with Friends and Family: Not on file    Attends [employer] Services: Not on file    Active Member of [employer] or Organizations: Not on file    Attends Club or Organization Meetings: Not on file    Marital Status: Not on file   Intimate Partner Violence:     Fear of Current or Ex-Partner: Not on file    Emotionally Abused: Not on file    Physically Abused: Not on file    Sexually Abused: Not on file   Housing Stability:     Unable to Pay for Housing in the Last Year: Not on file    Number of [place] in the Last Year: Not on file    Unstable Housing in the Last Year: Not on file         ALLERGIES: Patient has no known allergies. Review of Systems   A complete review of systems was performed and all systems reviewed are negative unless otherwise documented in the HPI    Vitals:    07/18/22 2050 07/18/22 2100 07/18/22 2110 07/18/22 2120   BP:  111/71     Pulse:       Resp:  18     Temp:       SpO2: 96% 97% 98% 98%   Weight:       Height:                Physical Exam  Constitutional:       Comments: Resting comfortably no acute distress   HENT:      Mouth/Throat:      Comments: Moist mucous membranes  Eyes:      General: No scleral icterus. Extraocular Movements: Extraocular movements intact. Neck:      Comments: Trachea midline. No nuchal rigidity  Cardiovascular:      Comments: Regular rate and rhythm without murmurs. Normal capillary refill  Pulmonary:      Effort: Pulmonary effort is normal. No respiratory distress. Breath sounds: Normal breath sounds. No wheezing or rales. Abdominal:      General: There is no distension. Palpations: Abdomen is soft. Tenderness:  There is no abdominal tenderness. Comments: No CVA tenderness bilaterally   Musculoskeletal:         General: No deformity. Normal range of motion. Cervical back: Normal range of motion. Right lower leg: No edema. Left lower leg: No edema. Skin:     General: Skin is warm and dry. Neurological:      Comments: Awake and alert. Speech is normal.  GCS 15          MDM   68-year-old female who presents with the above chief complaint. Vital signs are stable. She appears well on exam.  She has clear lungs. No abdominal tenderness or flank pain noted on exam.  Basic labs will be obtained as well as urinalysis. I have a low suspicion for an acute intracranial process such as intracranial hemorrhage or an infectious process such as meningitis. Her constellation symptoms sound mostly viral in nature. Labs are reassuring. Chest x-ray clear without any focal pneumonia. Her urinalysis does not appear overtly infected but given she says dysuria I am going to treat her for UTI. She is feeling much better on reassessment her headache is resolved. She maintained stable vital signs and continues to appear well and is appropriate for discharge. Patient given strict return precautions and discharged in stable condition.     Procedures

## 2022-07-19 NOTE — DISCHARGE INSTRUCTIONS
Return to the emergency department for any new or worsening symptoms, inability to eat or drink, severe abdominal pain, difficulty breathing, or any other symptoms you find concerning.   You can take Tylenol or ibuprofen as needed for any headache going forward and please take the antibiotics as directed

## 2022-07-20 LAB
BACTERIA SPEC CULT: ABNORMAL
BACTERIA SPEC CULT: ABNORMAL
CC UR VC: ABNORMAL
SERVICE CMNT-IMP: ABNORMAL

## 2024-01-29 ENCOUNTER — APPOINTMENT (OUTPATIENT)
Facility: HOSPITAL | Age: 34
End: 2024-01-29
Payer: MEDICAID

## 2024-01-29 ENCOUNTER — HOSPITAL ENCOUNTER (EMERGENCY)
Facility: HOSPITAL | Age: 34
Discharge: HOME | End: 2024-01-29
Attending: EMERGENCY MEDICINE
Payer: MEDICAID

## 2024-01-29 VITALS
BODY MASS INDEX: 24.85 KG/M2 | HEART RATE: 68 BPM | WEIGHT: 167.77 LBS | RESPIRATION RATE: 16 BRPM | TEMPERATURE: 97.9 F | HEIGHT: 69 IN | OXYGEN SATURATION: 98 % | SYSTOLIC BLOOD PRESSURE: 104 MMHG | DIASTOLIC BLOOD PRESSURE: 73 MMHG

## 2024-01-29 DIAGNOSIS — R42 DIZZINESS: Primary | ICD-10-CM

## 2024-01-29 DIAGNOSIS — R06.02 SHORTNESS OF BREATH: ICD-10-CM

## 2024-01-29 LAB
ALBUMIN SERPL-MCNC: 3.7 G/DL (ref 3.5–5)
ALBUMIN/GLOB SERPL: 1.1 (ref 1.1–2.2)
ALP SERPL-CCNC: 64 U/L (ref 45–117)
ALT SERPL-CCNC: 13 U/L (ref 12–78)
ANION GAP SERPL CALC-SCNC: 8 MMOL/L (ref 5–15)
AST SERPL-CCNC: 12 U/L (ref 15–37)
BASOPHILS # BLD: 0 K/UL (ref 0–0.1)
BASOPHILS NFR BLD: 0 % (ref 0–1)
BILIRUB SERPL-MCNC: 0.4 MG/DL (ref 0.2–1)
BUN SERPL-MCNC: 14 MG/DL (ref 6–20)
BUN/CREAT SERPL: 21 (ref 12–20)
CALCIUM SERPL-MCNC: 8.5 MG/DL (ref 8.5–10.1)
CHLORIDE SERPL-SCNC: 105 MMOL/L (ref 97–108)
CO2 SERPL-SCNC: 30 MMOL/L (ref 21–32)
CREAT SERPL-MCNC: 0.68 MG/DL (ref 0.55–1.02)
D DIMER PPP FEU-MCNC: 0.27 MG/L FEU (ref 0–0.65)
DIFFERENTIAL METHOD BLD: ABNORMAL
EKG ATRIAL RATE: 75 BPM
EKG DIAGNOSIS: NORMAL
EKG P AXIS: 64 DEGREES
EKG P-R INTERVAL: 110 MS
EKG Q-T INTERVAL: 390 MS
EKG QRS DURATION: 90 MS
EKG QTC CALCULATION (BAZETT): 435 MS
EKG R AXIS: 76 DEGREES
EKG T AXIS: 65 DEGREES
EKG VENTRICULAR RATE: 75 BPM
EOSINOPHIL # BLD: 0.1 K/UL (ref 0–0.4)
EOSINOPHIL NFR BLD: 1 % (ref 0–7)
ERYTHROCYTE [DISTWIDTH] IN BLOOD BY AUTOMATED COUNT: 13.3 % (ref 11.5–14.5)
GLOBULIN SER CALC-MCNC: 3.5 G/DL (ref 2–4)
GLUCOSE SERPL-MCNC: 84 MG/DL (ref 65–100)
HCG UR QL: NEGATIVE
HCT VFR BLD AUTO: 40.6 % (ref 35–47)
HGB BLD-MCNC: 13 G/DL (ref 11.5–16)
IMM GRANULOCYTES # BLD AUTO: 0 K/UL (ref 0–0.04)
IMM GRANULOCYTES NFR BLD AUTO: 0 % (ref 0–0.5)
LYMPHOCYTES # BLD: 2 K/UL (ref 0.8–3.5)
LYMPHOCYTES NFR BLD: 24 % (ref 12–49)
MAGNESIUM SERPL-MCNC: 1.9 MG/DL (ref 1.6–2.4)
MCH RBC QN AUTO: 30.3 PG (ref 26–34)
MCHC RBC AUTO-ENTMCNC: 32 G/DL (ref 30–36.5)
MCV RBC AUTO: 94.6 FL (ref 80–99)
MONOCYTES # BLD: 0.4 K/UL (ref 0–1)
MONOCYTES NFR BLD: 4 % (ref 5–13)
NEUTS SEG # BLD: 6.1 K/UL (ref 1.8–8)
NEUTS SEG NFR BLD: 71 % (ref 32–75)
NRBC # BLD: 0 K/UL (ref 0–0.01)
NRBC BLD-RTO: 0 PER 100 WBC
PLATELET # BLD AUTO: 202 K/UL (ref 150–400)
PMV BLD AUTO: 10.7 FL (ref 8.9–12.9)
POTASSIUM SERPL-SCNC: 3.5 MMOL/L (ref 3.5–5.1)
PROT SERPL-MCNC: 7.2 G/DL (ref 6.4–8.2)
RBC # BLD AUTO: 4.29 M/UL (ref 3.8–5.2)
SODIUM SERPL-SCNC: 143 MMOL/L (ref 136–145)
TROPONIN I SERPL HS-MCNC: <4 NG/L (ref 0–51)
WBC # BLD AUTO: 8.7 K/UL (ref 3.6–11)

## 2024-01-29 PROCEDURE — 96361 HYDRATE IV INFUSION ADD-ON: CPT

## 2024-01-29 PROCEDURE — 99285 EMERGENCY DEPT VISIT HI MDM: CPT

## 2024-01-29 PROCEDURE — 70450 CT HEAD/BRAIN W/O DYE: CPT

## 2024-01-29 PROCEDURE — 2580000003 HC RX 258

## 2024-01-29 PROCEDURE — 71046 X-RAY EXAM CHEST 2 VIEWS: CPT

## 2024-01-29 PROCEDURE — 84484 ASSAY OF TROPONIN QUANT: CPT

## 2024-01-29 PROCEDURE — 83735 ASSAY OF MAGNESIUM: CPT

## 2024-01-29 PROCEDURE — 6360000002 HC RX W HCPCS

## 2024-01-29 PROCEDURE — 36415 COLL VENOUS BLD VENIPUNCTURE: CPT

## 2024-01-29 PROCEDURE — 93005 ELECTROCARDIOGRAM TRACING: CPT | Performed by: EMERGENCY MEDICINE

## 2024-01-29 PROCEDURE — 85025 COMPLETE CBC W/AUTO DIFF WBC: CPT

## 2024-01-29 PROCEDURE — 96374 THER/PROPH/DIAG INJ IV PUSH: CPT

## 2024-01-29 PROCEDURE — 85379 FIBRIN DEGRADATION QUANT: CPT

## 2024-01-29 PROCEDURE — 81025 URINE PREGNANCY TEST: CPT

## 2024-01-29 PROCEDURE — 80053 COMPREHEN METABOLIC PANEL: CPT

## 2024-01-29 RX ORDER — ALPRAZOLAM 0.25 MG/1
0.25 TABLET ORAL NIGHTLY PRN
COMMUNITY

## 2024-01-29 RX ORDER — ESCITALOPRAM OXALATE 10 MG/1
10 TABLET ORAL DAILY
COMMUNITY

## 2024-01-29 RX ORDER — ONDANSETRON 2 MG/ML
4 INJECTION INTRAMUSCULAR; INTRAVENOUS
Status: COMPLETED | OUTPATIENT
Start: 2024-01-29 | End: 2024-01-29

## 2024-01-29 RX ORDER — 0.9 % SODIUM CHLORIDE 0.9 %
1000 INTRAVENOUS SOLUTION INTRAVENOUS ONCE
Status: COMPLETED | OUTPATIENT
Start: 2024-01-29 | End: 2024-01-29

## 2024-01-29 RX ADMIN — ONDANSETRON 4 MG: 2 INJECTION INTRAMUSCULAR; INTRAVENOUS at 13:16

## 2024-01-29 RX ADMIN — SODIUM CHLORIDE 1000 ML: 9 INJECTION, SOLUTION INTRAVENOUS at 13:05

## 2024-01-29 ASSESSMENT — ENCOUNTER SYMPTOMS
CHEST TIGHTNESS: 1
SHORTNESS OF BREATH: 1

## 2024-01-29 ASSESSMENT — PAIN SCALES - GENERAL: PAINLEVEL_OUTOF10: 5

## 2024-01-29 ASSESSMENT — PAIN DESCRIPTION - LOCATION: LOCATION: CHEST

## 2024-01-29 NOTE — ED TRIAGE NOTES
Pt c/o feeling tired, chest tightness since Friday. Pt states that her BP has been low this week 103/68 yesterday at home. +nausea, a little diarrhea

## 2024-01-29 NOTE — ED PROVIDER NOTES
focal deficit present.      Mental Status: She is alert.      GCS: GCS eye subscore is 4. GCS verbal subscore is 5. GCS motor subscore is 6.      Cranial Nerves: Cranial nerves 2-12 are intact.      Sensory: Sensation is intact.      Motor: Motor function is intact.      Coordination: Coordination is intact.      Gait: Gait is intact.   Psychiatric:         Mood and Affect: Mood normal.         Behavior: Behavior normal.           EMERGENCY DEPARTMENT COURSE and DIFFERENTIAL DIAGNOSIS/MDM:   Vitals:    Vitals:    01/29/24 1145 01/29/24 1515   BP: 110/68 104/73   Pulse: 86 68   Resp: 18 16   Temp: 97.9 °F (36.6 °C)    TempSrc: Oral    SpO2: 98% 98%   Weight: 76.1 kg (167 lb 12.3 oz)    Height: 1.753 m (5' 9\")          Medical Decision Making  Patient is a 33-year-old female with history of anxiety, asthma, and depression who presents to the emergency room with reports of a syncopal episode on Friday after having blood drawn. Ddx: lesion, tumor, dehydration, electrolyte abnormality, acute coronary syndrome, pulmonary embolism, and others.  Physical examination shows unremarkable cardiopulmonary examination.  Neurological examination unremarkable.  Patient has no abdominal tenderness.  Vitals are stable.  Zofran and fluids ordered.  EKG read by attending. POC pregnancy negative. CBC unremarkable. CMP unremarkable. Magnesium unremarkable. Troponin negative. D-dimer unremarkable. Chest x-ray shows clear lungs. CT of head shows no acute intracranial hemorrhage, mass, or infarct. Discussed results with the patient who reports feeling a lot better.  Patient reports no symptoms at this time.  Patient reports that she is glad that everything is okay.  I have recommended that she follows up with her primary care provider by calling and scheduling appointment.  Patient is in no acute distress and okay for discharge. Patient is agreeable to plan. All questions answered. Return precautions provided and discharged home at this

## 2024-01-29 NOTE — DISCHARGE INSTRUCTIONS
Discussed visit today. Please follow-up with PCP by calling and scheduling an appointment. Encourage fluids at home.     Return to the ER with any worsening of symptoms.

## 2024-06-14 ENCOUNTER — HOSPITAL ENCOUNTER (EMERGENCY)
Facility: HOSPITAL | Age: 34
Discharge: HOME | End: 2024-06-14
Attending: EMERGENCY MEDICINE
Payer: COMMERCIAL

## 2024-06-14 ENCOUNTER — APPOINTMENT (OUTPATIENT)
Facility: HOSPITAL | Age: 34
End: 2024-06-14
Payer: COMMERCIAL

## 2024-06-14 VITALS
RESPIRATION RATE: 18 BRPM | SYSTOLIC BLOOD PRESSURE: 118 MMHG | TEMPERATURE: 98 F | HEIGHT: 69 IN | BODY MASS INDEX: 25.77 KG/M2 | HEART RATE: 89 BPM | OXYGEN SATURATION: 99 % | WEIGHT: 174 LBS | DIASTOLIC BLOOD PRESSURE: 78 MMHG

## 2024-06-14 DIAGNOSIS — N20.0 KIDNEY STONE: ICD-10-CM

## 2024-06-14 DIAGNOSIS — R10.9 BILATERAL FLANK PAIN: Primary | ICD-10-CM

## 2024-06-14 LAB
ALBUMIN SERPL-MCNC: 3.8 G/DL (ref 3.5–5)
ALBUMIN/GLOB SERPL: 1.2 (ref 1.1–2.2)
ALP SERPL-CCNC: 53 U/L (ref 45–117)
ALT SERPL-CCNC: 20 U/L (ref 12–78)
ANION GAP SERPL CALC-SCNC: 7 MMOL/L (ref 5–15)
APPEARANCE UR: CLEAR
AST SERPL-CCNC: 15 U/L (ref 15–37)
BACTERIA URNS QL MICRO: ABNORMAL /HPF
BASOPHILS # BLD: 0 K/UL (ref 0–0.1)
BASOPHILS NFR BLD: 1 % (ref 0–1)
BILIRUB SERPL-MCNC: 0.3 MG/DL (ref 0.2–1)
BILIRUB UR QL: NEGATIVE
BUN SERPL-MCNC: 14 MG/DL (ref 6–20)
BUN/CREAT SERPL: 17 (ref 12–20)
CALCIUM SERPL-MCNC: 8.5 MG/DL (ref 8.5–10.1)
CHLORIDE SERPL-SCNC: 104 MMOL/L (ref 97–108)
CO2 SERPL-SCNC: 30 MMOL/L (ref 21–32)
COLOR UR: ABNORMAL
CREAT SERPL-MCNC: 0.83 MG/DL (ref 0.55–1.02)
DIFFERENTIAL METHOD BLD: NORMAL
EOSINOPHIL # BLD: 0.1 K/UL (ref 0–0.4)
EOSINOPHIL NFR BLD: 2 % (ref 0–7)
EPITH CASTS URNS QL MICRO: ABNORMAL /LPF
ERYTHROCYTE [DISTWIDTH] IN BLOOD BY AUTOMATED COUNT: 12.8 % (ref 11.5–14.5)
GLOBULIN SER CALC-MCNC: 3.1 G/DL (ref 2–4)
GLUCOSE SERPL-MCNC: 87 MG/DL (ref 65–100)
GLUCOSE UR STRIP.AUTO-MCNC: 100 MG/DL
HCG UR QL: NEGATIVE
HCT VFR BLD AUTO: 38 % (ref 35–47)
HGB BLD-MCNC: 12.3 G/DL (ref 11.5–16)
HGB UR QL STRIP: NEGATIVE
IMM GRANULOCYTES # BLD AUTO: 0 K/UL (ref 0–0.04)
IMM GRANULOCYTES NFR BLD AUTO: 0 % (ref 0–0.5)
KETONES UR QL STRIP.AUTO: ABNORMAL MG/DL
LEUKOCYTE ESTERASE UR QL STRIP.AUTO: NEGATIVE
LYMPHOCYTES # BLD: 2 K/UL (ref 0.8–3.5)
LYMPHOCYTES NFR BLD: 31 % (ref 12–49)
MCH RBC QN AUTO: 30.5 PG (ref 26–34)
MCHC RBC AUTO-ENTMCNC: 32.4 G/DL (ref 30–36.5)
MCV RBC AUTO: 94.3 FL (ref 80–99)
MONOCYTES # BLD: 0.4 K/UL (ref 0–1)
MONOCYTES NFR BLD: 6 % (ref 5–13)
MUCOUS THREADS URNS QL MICRO: ABNORMAL /LPF
NEUTS SEG # BLD: 3.9 K/UL (ref 1.8–8)
NEUTS SEG NFR BLD: 60 % (ref 32–75)
NITRITE UR QL STRIP.AUTO: POSITIVE
NRBC # BLD: 0 K/UL (ref 0–0.01)
NRBC BLD-RTO: 0 PER 100 WBC
PH UR STRIP: 6.5 (ref 5–8)
PLATELET # BLD AUTO: 195 K/UL (ref 150–400)
PMV BLD AUTO: 10.4 FL (ref 8.9–12.9)
POTASSIUM SERPL-SCNC: 4.1 MMOL/L (ref 3.5–5.1)
PROT SERPL-MCNC: 6.9 G/DL (ref 6.4–8.2)
PROT UR STRIP-MCNC: ABNORMAL MG/DL
RBC # BLD AUTO: 4.03 M/UL (ref 3.8–5.2)
RBC #/AREA URNS HPF: ABNORMAL /HPF (ref 0–5)
SODIUM SERPL-SCNC: 141 MMOL/L (ref 136–145)
SP GR UR REFRACTOMETRY: 1.02 (ref 1–1.03)
URINE CULTURE IF INDICATED: ABNORMAL
UROBILINOGEN UR QL STRIP.AUTO: 2 EU/DL (ref 0.2–1)
WBC # BLD AUTO: 6.4 K/UL (ref 3.6–11)
WBC URNS QL MICRO: ABNORMAL /HPF (ref 0–4)

## 2024-06-14 PROCEDURE — 87086 URINE CULTURE/COLONY COUNT: CPT

## 2024-06-14 PROCEDURE — 96374 THER/PROPH/DIAG INJ IV PUSH: CPT

## 2024-06-14 PROCEDURE — 36415 COLL VENOUS BLD VENIPUNCTURE: CPT

## 2024-06-14 PROCEDURE — 2580000003 HC RX 258: Performed by: EMERGENCY MEDICINE

## 2024-06-14 PROCEDURE — 85025 COMPLETE CBC W/AUTO DIFF WBC: CPT

## 2024-06-14 PROCEDURE — 74176 CT ABD & PELVIS W/O CONTRAST: CPT

## 2024-06-14 PROCEDURE — 6370000000 HC RX 637 (ALT 250 FOR IP): Performed by: EMERGENCY MEDICINE

## 2024-06-14 PROCEDURE — 81025 URINE PREGNANCY TEST: CPT

## 2024-06-14 PROCEDURE — 81001 URINALYSIS AUTO W/SCOPE: CPT

## 2024-06-14 PROCEDURE — 6360000002 HC RX W HCPCS: Performed by: EMERGENCY MEDICINE

## 2024-06-14 PROCEDURE — 80053 COMPREHEN METABOLIC PANEL: CPT

## 2024-06-14 PROCEDURE — 99284 EMERGENCY DEPT VISIT MOD MDM: CPT

## 2024-06-14 RX ORDER — TRAMADOL HYDROCHLORIDE 50 MG/1
50 TABLET ORAL EVERY 4 HOURS PRN
Qty: 18 TABLET | Refills: 0 | Status: SHIPPED | OUTPATIENT
Start: 2024-06-14 | End: 2024-06-17

## 2024-06-14 RX ORDER — KETOROLAC TROMETHAMINE 30 MG/ML
15 INJECTION, SOLUTION INTRAMUSCULAR; INTRAVENOUS ONCE
Status: COMPLETED | OUTPATIENT
Start: 2024-06-14 | End: 2024-06-14

## 2024-06-14 RX ORDER — TRAMADOL HYDROCHLORIDE 50 MG/1
50 TABLET ORAL EVERY 4 HOURS PRN
Qty: 18 TABLET | Refills: 0 | Status: SHIPPED | OUTPATIENT
Start: 2024-06-14 | End: 2024-06-14

## 2024-06-14 RX ORDER — CYCLOBENZAPRINE HCL 10 MG
10 TABLET ORAL
Status: COMPLETED | OUTPATIENT
Start: 2024-06-14 | End: 2024-06-14

## 2024-06-14 RX ORDER — 0.9 % SODIUM CHLORIDE 0.9 %
1000 INTRAVENOUS SOLUTION INTRAVENOUS ONCE
Status: COMPLETED | OUTPATIENT
Start: 2024-06-14 | End: 2024-06-14

## 2024-06-14 RX ADMIN — SODIUM CHLORIDE 1000 ML: 9 INJECTION, SOLUTION INTRAVENOUS at 16:51

## 2024-06-14 RX ADMIN — KETOROLAC TROMETHAMINE 15 MG: 30 INJECTION, SOLUTION INTRAMUSCULAR at 16:52

## 2024-06-14 RX ADMIN — CYCLOBENZAPRINE 10 MG: 10 TABLET, FILM COATED ORAL at 19:04

## 2024-06-14 ASSESSMENT — PAIN DESCRIPTION - DESCRIPTORS: DESCRIPTORS: ACHING

## 2024-06-14 ASSESSMENT — PAIN SCALES - GENERAL
PAINLEVEL_OUTOF10: 9
PAINLEVEL_OUTOF10: 3

## 2024-06-14 ASSESSMENT — PAIN - FUNCTIONAL ASSESSMENT: PAIN_FUNCTIONAL_ASSESSMENT: 0-10

## 2024-06-14 ASSESSMENT — PAIN DESCRIPTION - LOCATION: LOCATION: BACK

## 2024-06-14 NOTE — ED PROVIDER NOTES
Alcohol use: Yes    Drug use: Never         PHYSICAL EXAM       ED Triage Vitals [06/14/24 1628]   BP Temp Temp Source Pulse Respirations SpO2 Height Weight - Scale   122/76 98 °F (36.7 °C) Oral 89 18 96 % 1.753 m (5' 9\") 78.9 kg (174 lb)       Body mass index is 25.7 kg/m².    Physical Exam  Constitutional:       Comments: Sitting upright no acute distress   Eyes:      General: No scleral icterus.     Extraocular Movements: Extraocular movements intact.   Cardiovascular:      Rate and Rhythm: Normal rate and regular rhythm.      Heart sounds: No murmur heard.  Pulmonary:      Effort: Pulmonary effort is normal. No respiratory distress.      Breath sounds: Normal breath sounds. No wheezing or rales.   Abdominal:      General: There is no distension.      Palpations: Abdomen is soft.      Tenderness: There is no abdominal tenderness. There is right CVA tenderness and left CVA tenderness.   Musculoskeletal:         General: No deformity. Normal range of motion.   Skin:     General: Skin is warm and dry.      Comments: No rash on the back or flank   Neurological:      Comments: Awake and alert.  GCS 15             EMERGENCY DEPARTMENT COURSE and DIFFERENTIAL DIAGNOSIS/MDM:   Vitals:    Vitals:    06/14/24 1628   BP: 122/76   Pulse: 89   Resp: 18   Temp: 98 °F (36.7 °C)   TempSrc: Oral   SpO2: 96%   Weight: 78.9 kg (174 lb)   Height: 1.753 m (5' 9\")         Medical Decision Making  Amount and/or Complexity of Data Reviewed  Labs: ordered.  Radiology: ordered.    Risk  Prescription drug management.      Patient presenting with the above chief complaint.  Vitals are stable.  Nontoxic-appearing on exam.  Bilateral CVA tenderness noted.  Will proceed with labs CT and urinalysis.  Patient be treated symptomatically while waiting on results      REASSESSMENT      Patient's labs are grossly reassuring.  No leukocytosis appreciated.  Urinalysis is nitrite positive but this is likely due to the Azo she has been taking.  Overall

## 2024-06-14 NOTE — ED TRIAGE NOTES
Bilat flank pain onset 3 days 9/10, associated nausea, denies pain on urination or retention, denies blood present. Pt currently taking azo as of today. Pt reports chills 2 days ago. Hx of kidney stones

## 2024-06-14 NOTE — DISCHARGE INSTRUCTIONS
Return to the emergency department with any worsening symptoms, fever, or anything else you find concerning.  In the meantime I have provided you with information to follow-up with urology if needed.  I also recommend heat and light stretching to the areas of pain.  I did send her urine for culture and if it comes back positive for bacteria we will contact you

## 2024-06-15 LAB
BACTERIA SPEC CULT: NORMAL
CC UR VC: NORMAL
SERVICE CMNT-IMP: NORMAL